# Patient Record
Sex: MALE | Race: ASIAN | NOT HISPANIC OR LATINO | URBAN - METROPOLITAN AREA
[De-identification: names, ages, dates, MRNs, and addresses within clinical notes are randomized per-mention and may not be internally consistent; named-entity substitution may affect disease eponyms.]

---

## 2020-01-01 ENCOUNTER — INPATIENT (INPATIENT)
Facility: HOSPITAL | Age: 85
LOS: 5 days | DRG: 177 | End: 2020-04-16
Attending: INTERNAL MEDICINE | Admitting: INTERNAL MEDICINE
Payer: COMMERCIAL

## 2020-01-01 VITALS
DIASTOLIC BLOOD PRESSURE: 50 MMHG | OXYGEN SATURATION: 89 % | SYSTOLIC BLOOD PRESSURE: 116 MMHG | RESPIRATION RATE: 36 BRPM | HEART RATE: 77 BPM | TEMPERATURE: 100 F

## 2020-01-01 VITALS
HEART RATE: 80 BPM | DIASTOLIC BLOOD PRESSURE: 80 MMHG | HEIGHT: 67 IN | TEMPERATURE: 100 F | WEIGHT: 154.98 LBS | RESPIRATION RATE: 26 BRPM | OXYGEN SATURATION: 83 % | SYSTOLIC BLOOD PRESSURE: 161 MMHG

## 2020-01-01 DIAGNOSIS — N17.9 ACUTE KIDNEY FAILURE, UNSPECIFIED: ICD-10-CM

## 2020-01-01 DIAGNOSIS — I48.91 UNSPECIFIED ATRIAL FIBRILLATION: ICD-10-CM

## 2020-01-01 DIAGNOSIS — J44.9 CHRONIC OBSTRUCTIVE PULMONARY DISEASE, UNSPECIFIED: ICD-10-CM

## 2020-01-01 DIAGNOSIS — J96.91 RESPIRATORY FAILURE, UNSPECIFIED WITH HYPOXIA: ICD-10-CM

## 2020-01-01 DIAGNOSIS — Z71.89 OTHER SPECIFIED COUNSELING: ICD-10-CM

## 2020-01-01 DIAGNOSIS — J12.9 VIRAL PNEUMONIA, UNSPECIFIED: ICD-10-CM

## 2020-01-01 DIAGNOSIS — R09.02 HYPOXEMIA: ICD-10-CM

## 2020-01-01 DIAGNOSIS — R41.0 DISORIENTATION, UNSPECIFIED: ICD-10-CM

## 2020-01-01 DIAGNOSIS — Z29.9 ENCOUNTER FOR PROPHYLACTIC MEASURES, UNSPECIFIED: ICD-10-CM

## 2020-01-01 LAB
ALBUMIN SERPL ELPH-MCNC: 2.9 G/DL — LOW (ref 3.5–5)
ALBUMIN SERPL ELPH-MCNC: 2.9 G/DL — LOW (ref 3.5–5)
ALBUMIN SERPL ELPH-MCNC: 3.3 G/DL — LOW (ref 3.5–5)
ALP SERPL-CCNC: 104 U/L — SIGNIFICANT CHANGE UP (ref 40–120)
ALP SERPL-CCNC: 130 U/L — HIGH (ref 40–120)
ALP SERPL-CCNC: 95 U/L — SIGNIFICANT CHANGE UP (ref 40–120)
ALT FLD-CCNC: 104 U/L DA — HIGH (ref 10–60)
ALT FLD-CCNC: 56 U/L DA — SIGNIFICANT CHANGE UP (ref 10–60)
ALT FLD-CCNC: 80 U/L DA — HIGH (ref 10–60)
ANION GAP SERPL CALC-SCNC: 4 MMOL/L — LOW (ref 5–17)
ANION GAP SERPL CALC-SCNC: 4 MMOL/L — LOW (ref 5–17)
ANION GAP SERPL CALC-SCNC: 6 MMOL/L — SIGNIFICANT CHANGE UP (ref 5–17)
ANION GAP SERPL CALC-SCNC: 7 MMOL/L — SIGNIFICANT CHANGE UP (ref 5–17)
ANION GAP SERPL CALC-SCNC: 8 MMOL/L — SIGNIFICANT CHANGE UP (ref 5–17)
APPEARANCE UR: CLEAR — SIGNIFICANT CHANGE UP
APTT BLD: 42.3 SEC — HIGH (ref 27.5–36.3)
AST SERPL-CCNC: 41 U/L — HIGH (ref 10–40)
AST SERPL-CCNC: 41 U/L — HIGH (ref 10–40)
AST SERPL-CCNC: 65 U/L — HIGH (ref 10–40)
BACTERIA # UR AUTO: ABNORMAL /HPF
BASOPHILS # BLD AUTO: 0.01 K/UL — SIGNIFICANT CHANGE UP (ref 0–0.2)
BASOPHILS # BLD AUTO: 0.01 K/UL — SIGNIFICANT CHANGE UP (ref 0–0.2)
BASOPHILS NFR BLD AUTO: 0.1 % — SIGNIFICANT CHANGE UP (ref 0–2)
BASOPHILS NFR BLD AUTO: 0.1 % — SIGNIFICANT CHANGE UP (ref 0–2)
BILIRUB SERPL-MCNC: 0.6 MG/DL — SIGNIFICANT CHANGE UP (ref 0.2–1.2)
BILIRUB SERPL-MCNC: 0.6 MG/DL — SIGNIFICANT CHANGE UP (ref 0.2–1.2)
BILIRUB SERPL-MCNC: 1 MG/DL — SIGNIFICANT CHANGE UP (ref 0.2–1.2)
BILIRUB UR-MCNC: NEGATIVE — SIGNIFICANT CHANGE UP
BUN SERPL-MCNC: 15 MG/DL — SIGNIFICANT CHANGE UP (ref 7–18)
BUN SERPL-MCNC: 16 MG/DL — SIGNIFICANT CHANGE UP (ref 7–18)
BUN SERPL-MCNC: 19 MG/DL — HIGH (ref 7–18)
BUN SERPL-MCNC: 31 MG/DL — HIGH (ref 7–18)
BUN SERPL-MCNC: 32 MG/DL — HIGH (ref 7–18)
CALCIUM SERPL-MCNC: 8.1 MG/DL — LOW (ref 8.4–10.5)
CALCIUM SERPL-MCNC: 8.2 MG/DL — LOW (ref 8.4–10.5)
CALCIUM SERPL-MCNC: 8.4 MG/DL — SIGNIFICANT CHANGE UP (ref 8.4–10.5)
CALCIUM SERPL-MCNC: 8.8 MG/DL — SIGNIFICANT CHANGE UP (ref 8.4–10.5)
CALCIUM SERPL-MCNC: 9.1 MG/DL — SIGNIFICANT CHANGE UP (ref 8.4–10.5)
CHLORIDE SERPL-SCNC: 105 MMOL/L — SIGNIFICANT CHANGE UP (ref 96–108)
CHLORIDE SERPL-SCNC: 110 MMOL/L — HIGH (ref 96–108)
CHLORIDE SERPL-SCNC: 112 MMOL/L — HIGH (ref 96–108)
CHLORIDE SERPL-SCNC: 112 MMOL/L — HIGH (ref 96–108)
CHLORIDE SERPL-SCNC: 113 MMOL/L — HIGH (ref 96–108)
CHOLEST SERPL-MCNC: 108 MG/DL — SIGNIFICANT CHANGE UP (ref 10–199)
CO2 SERPL-SCNC: 26 MMOL/L — SIGNIFICANT CHANGE UP (ref 22–31)
CO2 SERPL-SCNC: 26 MMOL/L — SIGNIFICANT CHANGE UP (ref 22–31)
CO2 SERPL-SCNC: 27 MMOL/L — SIGNIFICANT CHANGE UP (ref 22–31)
CO2 SERPL-SCNC: 29 MMOL/L — SIGNIFICANT CHANGE UP (ref 22–31)
CO2 SERPL-SCNC: 31 MMOL/L — SIGNIFICANT CHANGE UP (ref 22–31)
COLOR SPEC: YELLOW — SIGNIFICANT CHANGE UP
COMMENT - URINE: SIGNIFICANT CHANGE UP
CREAT SERPL-MCNC: 1 MG/DL — SIGNIFICANT CHANGE UP (ref 0.5–1.3)
CREAT SERPL-MCNC: 1.01 MG/DL — SIGNIFICANT CHANGE UP (ref 0.5–1.3)
CREAT SERPL-MCNC: 1.23 MG/DL — SIGNIFICANT CHANGE UP (ref 0.5–1.3)
CREAT SERPL-MCNC: 1.32 MG/DL — HIGH (ref 0.5–1.3)
CREAT SERPL-MCNC: 1.39 MG/DL — HIGH (ref 0.5–1.3)
CRP SERPL-MCNC: 2.52 MG/DL — HIGH (ref 0–0.4)
CRP SERPL-MCNC: 7.2 MG/DL — HIGH (ref 0–0.4)
D DIMER BLD IA.RAPID-MCNC: 2941 NG/ML DDU — HIGH
DIFF PNL FLD: ABNORMAL
EOSINOPHIL # BLD AUTO: 0.01 K/UL — SIGNIFICANT CHANGE UP (ref 0–0.5)
EOSINOPHIL # BLD AUTO: 0.03 K/UL — SIGNIFICANT CHANGE UP (ref 0–0.5)
EOSINOPHIL NFR BLD AUTO: 0.1 % — SIGNIFICANT CHANGE UP (ref 0–6)
EOSINOPHIL NFR BLD AUTO: 0.3 % — SIGNIFICANT CHANGE UP (ref 0–6)
EPI CELLS # UR: SIGNIFICANT CHANGE UP /HPF
FERRITIN SERPL-MCNC: 628 NG/ML — HIGH (ref 30–400)
FERRITIN SERPL-MCNC: 757 NG/ML — HIGH (ref 30–400)
GLUCOSE SERPL-MCNC: 131 MG/DL — HIGH (ref 70–99)
GLUCOSE SERPL-MCNC: 156 MG/DL — HIGH (ref 70–99)
GLUCOSE SERPL-MCNC: 156 MG/DL — HIGH (ref 70–99)
GLUCOSE SERPL-MCNC: 196 MG/DL — HIGH (ref 70–99)
GLUCOSE SERPL-MCNC: 92 MG/DL — SIGNIFICANT CHANGE UP (ref 70–99)
GLUCOSE UR QL: NEGATIVE — SIGNIFICANT CHANGE UP
HBA1C BLD-MCNC: 6.4 % — HIGH (ref 4–5.6)
HCT VFR BLD CALC: 40 % — SIGNIFICANT CHANGE UP (ref 39–50)
HCT VFR BLD CALC: 41.9 % — SIGNIFICANT CHANGE UP (ref 39–50)
HCT VFR BLD CALC: 42.1 % — SIGNIFICANT CHANGE UP (ref 39–50)
HCT VFR BLD CALC: 46.1 % — SIGNIFICANT CHANGE UP (ref 39–50)
HCT VFR BLD CALC: 47.2 % — SIGNIFICANT CHANGE UP (ref 39–50)
HDLC SERPL-MCNC: 25 MG/DL — LOW
HGB BLD-MCNC: 12.9 G/DL — LOW (ref 13–17)
HGB BLD-MCNC: 13.3 G/DL — SIGNIFICANT CHANGE UP (ref 13–17)
HGB BLD-MCNC: 13.6 G/DL — SIGNIFICANT CHANGE UP (ref 13–17)
HGB BLD-MCNC: 15.3 G/DL — SIGNIFICANT CHANGE UP (ref 13–17)
HGB BLD-MCNC: 15.4 G/DL — SIGNIFICANT CHANGE UP (ref 13–17)
IMM GRANULOCYTES NFR BLD AUTO: 0.6 % — SIGNIFICANT CHANGE UP (ref 0–1.5)
IMM GRANULOCYTES NFR BLD AUTO: 0.7 % — SIGNIFICANT CHANGE UP (ref 0–1.5)
INR BLD: 2.99 RATIO — HIGH (ref 0.88–1.16)
KETONES UR-MCNC: NEGATIVE — SIGNIFICANT CHANGE UP
LEUKOCYTE ESTERASE UR-ACNC: ABNORMAL
LIPID PNL WITH DIRECT LDL SERPL: 64 MG/DL — SIGNIFICANT CHANGE UP
LYMPHOCYTES # BLD AUTO: 0.81 K/UL — LOW (ref 1–3.3)
LYMPHOCYTES # BLD AUTO: 0.81 K/UL — LOW (ref 1–3.3)
LYMPHOCYTES # BLD AUTO: 7.6 % — LOW (ref 13–44)
LYMPHOCYTES # BLD AUTO: 8.8 % — LOW (ref 13–44)
MAGNESIUM SERPL-MCNC: 2.5 MG/DL — SIGNIFICANT CHANGE UP (ref 1.6–2.6)
MAGNESIUM SERPL-MCNC: 2.7 MG/DL — HIGH (ref 1.6–2.6)
MAGNESIUM SERPL-MCNC: 2.8 MG/DL — HIGH (ref 1.6–2.6)
MCHC RBC-ENTMCNC: 29.1 PG — SIGNIFICANT CHANGE UP (ref 27–34)
MCHC RBC-ENTMCNC: 29.8 PG — SIGNIFICANT CHANGE UP (ref 27–34)
MCHC RBC-ENTMCNC: 29.9 PG — SIGNIFICANT CHANGE UP (ref 27–34)
MCHC RBC-ENTMCNC: 30 PG — SIGNIFICANT CHANGE UP (ref 27–34)
MCHC RBC-ENTMCNC: 30.1 PG — SIGNIFICANT CHANGE UP (ref 27–34)
MCHC RBC-ENTMCNC: 31.6 GM/DL — LOW (ref 32–36)
MCHC RBC-ENTMCNC: 32.3 GM/DL — SIGNIFICANT CHANGE UP (ref 32–36)
MCHC RBC-ENTMCNC: 32.5 GM/DL — SIGNIFICANT CHANGE UP (ref 32–36)
MCHC RBC-ENTMCNC: 32.6 GM/DL — SIGNIFICANT CHANGE UP (ref 32–36)
MCHC RBC-ENTMCNC: 33.2 GM/DL — SIGNIFICANT CHANGE UP (ref 32–36)
MCV RBC AUTO: 90.2 FL — SIGNIFICANT CHANGE UP (ref 80–100)
MCV RBC AUTO: 91.8 FL — SIGNIFICANT CHANGE UP (ref 80–100)
MCV RBC AUTO: 92.1 FL — SIGNIFICANT CHANGE UP (ref 80–100)
MCV RBC AUTO: 92.4 FL — SIGNIFICANT CHANGE UP (ref 80–100)
MCV RBC AUTO: 92.7 FL — SIGNIFICANT CHANGE UP (ref 80–100)
MONOCYTES # BLD AUTO: 0.5 K/UL — SIGNIFICANT CHANGE UP (ref 0–0.9)
MONOCYTES # BLD AUTO: 0.72 K/UL — SIGNIFICANT CHANGE UP (ref 0–0.9)
MONOCYTES NFR BLD AUTO: 5.4 % — SIGNIFICANT CHANGE UP (ref 2–14)
MONOCYTES NFR BLD AUTO: 6.8 % — SIGNIFICANT CHANGE UP (ref 2–14)
NEUTROPHILS # BLD AUTO: 7.77 K/UL — HIGH (ref 1.8–7.4)
NEUTROPHILS # BLD AUTO: 8.99 K/UL — HIGH (ref 1.8–7.4)
NEUTROPHILS NFR BLD AUTO: 84.7 % — HIGH (ref 43–77)
NEUTROPHILS NFR BLD AUTO: 84.8 % — HIGH (ref 43–77)
NITRITE UR-MCNC: NEGATIVE — SIGNIFICANT CHANGE UP
NRBC # BLD: 0 /100 WBCS — SIGNIFICANT CHANGE UP (ref 0–0)
PH UR: 6 — SIGNIFICANT CHANGE UP (ref 5–8)
PHOSPHATE SERPL-MCNC: 2.1 MG/DL — LOW (ref 2.5–4.5)
PHOSPHATE SERPL-MCNC: 2.4 MG/DL — LOW (ref 2.5–4.5)
PHOSPHATE SERPL-MCNC: 3 MG/DL — SIGNIFICANT CHANGE UP (ref 2.5–4.5)
PLATELET # BLD AUTO: 153 K/UL — SIGNIFICANT CHANGE UP (ref 150–400)
PLATELET # BLD AUTO: 217 K/UL — SIGNIFICANT CHANGE UP (ref 150–400)
PLATELET # BLD AUTO: 220 K/UL — SIGNIFICANT CHANGE UP (ref 150–400)
PLATELET # BLD AUTO: 230 K/UL — SIGNIFICANT CHANGE UP (ref 150–400)
PLATELET # BLD AUTO: 235 K/UL — SIGNIFICANT CHANGE UP (ref 150–400)
POTASSIUM SERPL-MCNC: 3.2 MMOL/L — LOW (ref 3.5–5.3)
POTASSIUM SERPL-MCNC: 3.4 MMOL/L — LOW (ref 3.5–5.3)
POTASSIUM SERPL-MCNC: 4 MMOL/L — SIGNIFICANT CHANGE UP (ref 3.5–5.3)
POTASSIUM SERPL-MCNC: 4.1 MMOL/L — SIGNIFICANT CHANGE UP (ref 3.5–5.3)
POTASSIUM SERPL-MCNC: 4.4 MMOL/L — SIGNIFICANT CHANGE UP (ref 3.5–5.3)
POTASSIUM SERPL-SCNC: 3.2 MMOL/L — LOW (ref 3.5–5.3)
POTASSIUM SERPL-SCNC: 3.4 MMOL/L — LOW (ref 3.5–5.3)
POTASSIUM SERPL-SCNC: 4 MMOL/L — SIGNIFICANT CHANGE UP (ref 3.5–5.3)
POTASSIUM SERPL-SCNC: 4.1 MMOL/L — SIGNIFICANT CHANGE UP (ref 3.5–5.3)
POTASSIUM SERPL-SCNC: 4.4 MMOL/L — SIGNIFICANT CHANGE UP (ref 3.5–5.3)
PROCALCITONIN SERPL-MCNC: 0.11 NG/ML — HIGH (ref 0.02–0.1)
PROT SERPL-MCNC: 7.4 G/DL — SIGNIFICANT CHANGE UP (ref 6–8.3)
PROT SERPL-MCNC: 7.9 G/DL — SIGNIFICANT CHANGE UP (ref 6–8.3)
PROT SERPL-MCNC: 8.9 G/DL — HIGH (ref 6–8.3)
PROT UR-MCNC: 30 MG/DL
PROTHROM AB SERPL-ACNC: 35 SEC — HIGH (ref 10–12.9)
RBC # BLD: 4.33 M/UL — SIGNIFICANT CHANGE UP (ref 4.2–5.8)
RBC # BLD: 4.52 M/UL — SIGNIFICANT CHANGE UP (ref 4.2–5.8)
RBC # BLD: 4.57 M/UL — SIGNIFICANT CHANGE UP (ref 4.2–5.8)
RBC # BLD: 5.11 M/UL — SIGNIFICANT CHANGE UP (ref 4.2–5.8)
RBC # BLD: 5.14 M/UL — SIGNIFICANT CHANGE UP (ref 4.2–5.8)
RBC # FLD: 12.3 % — SIGNIFICANT CHANGE UP (ref 10.3–14.5)
RBC # FLD: 12.4 % — SIGNIFICANT CHANGE UP (ref 10.3–14.5)
RBC # FLD: 12.4 % — SIGNIFICANT CHANGE UP (ref 10.3–14.5)
RBC # FLD: 12.6 % — SIGNIFICANT CHANGE UP (ref 10.3–14.5)
RBC # FLD: 12.8 % — SIGNIFICANT CHANGE UP (ref 10.3–14.5)
RBC CASTS # UR COMP ASSIST: ABNORMAL /HPF (ref 0–2)
SARS-COV-2 RNA SPEC QL NAA+PROBE: DETECTED
SODIUM SERPL-SCNC: 140 MMOL/L — SIGNIFICANT CHANGE UP (ref 135–145)
SODIUM SERPL-SCNC: 144 MMOL/L — SIGNIFICANT CHANGE UP (ref 135–145)
SODIUM SERPL-SCNC: 144 MMOL/L — SIGNIFICANT CHANGE UP (ref 135–145)
SODIUM SERPL-SCNC: 145 MMOL/L — SIGNIFICANT CHANGE UP (ref 135–145)
SODIUM SERPL-SCNC: 147 MMOL/L — HIGH (ref 135–145)
SP GR SPEC: 1.01 — SIGNIFICANT CHANGE UP (ref 1.01–1.02)
TOTAL CHOLESTEROL/HDL RATIO MEASUREMENT: 4.3 RATIO — SIGNIFICANT CHANGE UP (ref 3.4–9.6)
TRIGL SERPL-MCNC: 95 MG/DL — SIGNIFICANT CHANGE UP (ref 10–149)
TSH SERPL-MCNC: 0.11 UU/ML — LOW (ref 0.34–4.82)
UROBILINOGEN FLD QL: NEGATIVE — SIGNIFICANT CHANGE UP
VIT B12 SERPL-MCNC: 1505 PG/ML — HIGH (ref 232–1245)
WBC # BLD: 10.6 K/UL — HIGH (ref 3.8–10.5)
WBC # BLD: 12.57 K/UL — HIGH (ref 3.8–10.5)
WBC # BLD: 14.32 K/UL — HIGH (ref 3.8–10.5)
WBC # BLD: 6.92 K/UL — SIGNIFICANT CHANGE UP (ref 3.8–10.5)
WBC # BLD: 9.18 K/UL — SIGNIFICANT CHANGE UP (ref 3.8–10.5)
WBC # FLD AUTO: 10.6 K/UL — HIGH (ref 3.8–10.5)
WBC # FLD AUTO: 12.57 K/UL — HIGH (ref 3.8–10.5)
WBC # FLD AUTO: 14.32 K/UL — HIGH (ref 3.8–10.5)
WBC # FLD AUTO: 6.92 K/UL — SIGNIFICANT CHANGE UP (ref 3.8–10.5)
WBC # FLD AUTO: 9.18 K/UL — SIGNIFICANT CHANGE UP (ref 3.8–10.5)
WBC UR QL: SIGNIFICANT CHANGE UP /HPF (ref 0–5)

## 2020-01-01 PROCEDURE — 71045 X-RAY EXAM CHEST 1 VIEW: CPT | Mod: 26

## 2020-01-01 PROCEDURE — 93005 ELECTROCARDIOGRAM TRACING: CPT

## 2020-01-01 PROCEDURE — 83036 HEMOGLOBIN GLYCOSYLATED A1C: CPT

## 2020-01-01 PROCEDURE — 82728 ASSAY OF FERRITIN: CPT

## 2020-01-01 PROCEDURE — 83735 ASSAY OF MAGNESIUM: CPT

## 2020-01-01 PROCEDURE — 85730 THROMBOPLASTIN TIME PARTIAL: CPT

## 2020-01-01 PROCEDURE — 87635 SARS-COV-2 COVID-19 AMP PRB: CPT

## 2020-01-01 PROCEDURE — 84100 ASSAY OF PHOSPHORUS: CPT

## 2020-01-01 PROCEDURE — 99285 EMERGENCY DEPT VISIT HI MDM: CPT

## 2020-01-01 PROCEDURE — 85027 COMPLETE CBC AUTOMATED: CPT

## 2020-01-01 PROCEDURE — 80053 COMPREHEN METABOLIC PANEL: CPT

## 2020-01-01 PROCEDURE — 80061 LIPID PANEL: CPT

## 2020-01-01 PROCEDURE — 86140 C-REACTIVE PROTEIN: CPT

## 2020-01-01 PROCEDURE — 85610 PROTHROMBIN TIME: CPT

## 2020-01-01 PROCEDURE — 82607 VITAMIN B-12: CPT

## 2020-01-01 PROCEDURE — 84145 PROCALCITONIN (PCT): CPT

## 2020-01-01 PROCEDURE — 85379 FIBRIN DEGRADATION QUANT: CPT

## 2020-01-01 PROCEDURE — 80048 BASIC METABOLIC PNL TOTAL CA: CPT

## 2020-01-01 PROCEDURE — 71045 X-RAY EXAM CHEST 1 VIEW: CPT

## 2020-01-01 PROCEDURE — 99285 EMERGENCY DEPT VISIT HI MDM: CPT | Mod: 25

## 2020-01-01 PROCEDURE — 84443 ASSAY THYROID STIM HORMONE: CPT

## 2020-01-01 PROCEDURE — 81001 URINALYSIS AUTO W/SCOPE: CPT

## 2020-01-01 PROCEDURE — 36415 COLL VENOUS BLD VENIPUNCTURE: CPT

## 2020-01-01 RX ORDER — RIVAROXABAN 15 MG-20MG
15 KIT ORAL
Refills: 0 | Status: DISCONTINUED | OUTPATIENT
Start: 2020-01-01 | End: 2020-01-01

## 2020-01-01 RX ORDER — SODIUM CHLORIDE 9 MG/ML
1000 INJECTION INTRAMUSCULAR; INTRAVENOUS; SUBCUTANEOUS
Refills: 0 | Status: DISCONTINUED | OUTPATIENT
Start: 2020-01-01 | End: 2020-01-01

## 2020-01-01 RX ORDER — ROBINUL 0.2 MG/ML
1 INJECTION INTRAMUSCULAR; INTRAVENOUS
Refills: 0 | Status: DISCONTINUED | OUTPATIENT
Start: 2020-01-01 | End: 2020-01-01

## 2020-01-01 RX ORDER — HYDROMORPHONE HYDROCHLORIDE 2 MG/ML
0.5 INJECTION INTRAMUSCULAR; INTRAVENOUS; SUBCUTANEOUS EVERY 4 HOURS
Refills: 0 | Status: DISCONTINUED | OUTPATIENT
Start: 2020-01-01 | End: 2020-01-01

## 2020-01-01 RX ORDER — SODIUM CHLORIDE 9 MG/ML
1000 INJECTION INTRAMUSCULAR; INTRAVENOUS; SUBCUTANEOUS ONCE
Refills: 0 | Status: COMPLETED | OUTPATIENT
Start: 2020-01-01 | End: 2020-01-01

## 2020-01-01 RX ORDER — ANAKINRA 100MG/0.67
100 SYRINGE (ML) SUBCUTANEOUS EVERY 6 HOURS
Refills: 0 | Status: DISCONTINUED | OUTPATIENT
Start: 2020-01-01 | End: 2020-01-01

## 2020-01-01 RX ORDER — ANAKINRA 100MG/0.67
SYRINGE (ML) SUBCUTANEOUS
Refills: 0 | Status: DISCONTINUED | OUTPATIENT
Start: 2020-01-01 | End: 2020-01-01

## 2020-01-01 RX ORDER — ACETAMINOPHEN 500 MG
650 TABLET ORAL EVERY 6 HOURS
Refills: 0 | Status: DISCONTINUED | OUTPATIENT
Start: 2020-01-01 | End: 2020-01-01

## 2020-01-01 RX ORDER — LATANOPROST 0.05 MG/ML
1 SOLUTION/ DROPS OPHTHALMIC; TOPICAL
Qty: 0 | Refills: 0 | DISCHARGE

## 2020-01-01 RX ORDER — HYDROMORPHONE HYDROCHLORIDE 2 MG/ML
0.5 INJECTION INTRAMUSCULAR; INTRAVENOUS; SUBCUTANEOUS ONCE
Refills: 0 | Status: DISCONTINUED | OUTPATIENT
Start: 2020-01-01 | End: 2020-01-01

## 2020-01-01 RX ORDER — METOPROLOL TARTRATE 50 MG
1 TABLET ORAL
Qty: 0 | Refills: 0 | DISCHARGE

## 2020-01-01 RX ORDER — HYDROXYCHLOROQUINE SULFATE 200 MG
800 TABLET ORAL EVERY 24 HOURS
Refills: 0 | Status: COMPLETED | OUTPATIENT
Start: 2020-01-01 | End: 2020-01-01

## 2020-01-01 RX ORDER — HALOPERIDOL DECANOATE 100 MG/ML
1 INJECTION INTRAMUSCULAR ONCE
Refills: 0 | Status: COMPLETED | OUTPATIENT
Start: 2020-01-01 | End: 2020-01-01

## 2020-01-01 RX ORDER — METOPROLOL TARTRATE 50 MG
25 TABLET ORAL DAILY
Refills: 0 | Status: DISCONTINUED | OUTPATIENT
Start: 2020-01-01 | End: 2020-01-01

## 2020-01-01 RX ORDER — POTASSIUM CHLORIDE 20 MEQ
20 PACKET (EA) ORAL ONCE
Refills: 0 | Status: COMPLETED | OUTPATIENT
Start: 2020-01-01 | End: 2020-01-01

## 2020-01-01 RX ORDER — HYDROXYCHLOROQUINE SULFATE 200 MG
400 TABLET ORAL EVERY 24 HOURS
Refills: 0 | Status: COMPLETED | OUTPATIENT
Start: 2020-01-01 | End: 2020-01-01

## 2020-01-01 RX ORDER — HYDROXYCHLOROQUINE SULFATE 200 MG
TABLET ORAL
Refills: 0 | Status: COMPLETED | OUTPATIENT
Start: 2020-01-01 | End: 2020-01-01

## 2020-01-01 RX ORDER — MORPHINE SULFATE 50 MG/1
1 CAPSULE, EXTENDED RELEASE ORAL ONCE
Refills: 0 | Status: DISCONTINUED | OUTPATIENT
Start: 2020-01-01 | End: 2020-01-01

## 2020-01-01 RX ORDER — RIVAROXABAN 15 MG-20MG
1 KIT ORAL
Qty: 0 | Refills: 0 | DISCHARGE

## 2020-01-01 RX ORDER — LATANOPROST 0.05 MG/ML
1 SOLUTION/ DROPS OPHTHALMIC; TOPICAL AT BEDTIME
Refills: 0 | Status: DISCONTINUED | OUTPATIENT
Start: 2020-01-01 | End: 2020-01-01

## 2020-01-01 RX ORDER — ACETAMINOPHEN 500 MG
975 TABLET ORAL ONCE
Refills: 0 | Status: COMPLETED | OUTPATIENT
Start: 2020-01-01 | End: 2020-01-01

## 2020-01-01 RX ORDER — FLUTICASONE FUROATE AND VILANTEROL TRIFENATATE 100; 25 UG/1; UG/1
1 POWDER RESPIRATORY (INHALATION)
Qty: 0 | Refills: 0 | DISCHARGE

## 2020-01-01 RX ORDER — ROBINUL 0.2 MG/ML
0.2 INJECTION INTRAMUSCULAR; INTRAVENOUS EVERY 6 HOURS
Refills: 0 | Status: DISCONTINUED | OUTPATIENT
Start: 2020-01-01 | End: 2020-01-01

## 2020-01-01 RX ORDER — POTASSIUM PHOSPHATE, MONOBASIC POTASSIUM PHOSPHATE, DIBASIC 236; 224 MG/ML; MG/ML
15 INJECTION, SOLUTION INTRAVENOUS ONCE
Refills: 0 | Status: COMPLETED | OUTPATIENT
Start: 2020-01-01 | End: 2020-01-01

## 2020-01-01 RX ORDER — ENOXAPARIN SODIUM 100 MG/ML
40 INJECTION SUBCUTANEOUS DAILY
Refills: 0 | Status: DISCONTINUED | OUTPATIENT
Start: 2020-01-01 | End: 2020-01-01

## 2020-01-01 RX ORDER — POTASSIUM CHLORIDE 20 MEQ
40 PACKET (EA) ORAL ONCE
Refills: 0 | Status: COMPLETED | OUTPATIENT
Start: 2020-01-01 | End: 2020-01-01

## 2020-01-01 RX ADMIN — HYDROMORPHONE HYDROCHLORIDE 0.5 MILLIGRAM(S): 2 INJECTION INTRAMUSCULAR; INTRAVENOUS; SUBCUTANEOUS at 01:48

## 2020-01-01 RX ADMIN — Medication 40 MILLIGRAM(S): at 17:23

## 2020-01-01 RX ADMIN — Medication 40 MILLIGRAM(S): at 17:28

## 2020-01-01 RX ADMIN — Medication 100 MILLIGRAM(S): at 04:33

## 2020-01-01 RX ADMIN — HYDROMORPHONE HYDROCHLORIDE 0.5 MILLIGRAM(S): 2 INJECTION INTRAMUSCULAR; INTRAVENOUS; SUBCUTANEOUS at 14:59

## 2020-01-01 RX ADMIN — HALOPERIDOL DECANOATE 1 MILLIGRAM(S): 100 INJECTION INTRAMUSCULAR at 11:23

## 2020-01-01 RX ADMIN — HYDROMORPHONE HYDROCHLORIDE 0.5 MILLIGRAM(S): 2 INJECTION INTRAMUSCULAR; INTRAVENOUS; SUBCUTANEOUS at 21:42

## 2020-01-01 RX ADMIN — Medication 40 MILLIGRAM(S): at 04:34

## 2020-01-01 RX ADMIN — Medication 100 MILLIGRAM(S): at 18:03

## 2020-01-01 RX ADMIN — HYDROMORPHONE HYDROCHLORIDE 0.5 MILLIGRAM(S): 2 INJECTION INTRAMUSCULAR; INTRAVENOUS; SUBCUTANEOUS at 05:13

## 2020-01-01 RX ADMIN — Medication 100 MILLIGRAM(S): at 21:44

## 2020-01-01 RX ADMIN — Medication 40 MILLIGRAM(S): at 05:40

## 2020-01-01 RX ADMIN — Medication 100 MILLIGRAM(S): at 22:10

## 2020-01-01 RX ADMIN — Medication 20 MILLIEQUIVALENT(S): at 17:27

## 2020-01-01 RX ADMIN — RIVAROXABAN 15 MILLIGRAM(S): KIT at 17:23

## 2020-01-01 RX ADMIN — SODIUM CHLORIDE 1000 MILLILITER(S): 9 INJECTION INTRAMUSCULAR; INTRAVENOUS; SUBCUTANEOUS at 13:42

## 2020-01-01 RX ADMIN — RIVAROXABAN 15 MILLIGRAM(S): KIT at 19:33

## 2020-01-01 RX ADMIN — Medication 400 MILLIGRAM(S): at 21:14

## 2020-01-01 RX ADMIN — LATANOPROST 1 DROP(S): 0.05 SOLUTION/ DROPS OPHTHALMIC; TOPICAL at 20:59

## 2020-01-01 RX ADMIN — Medication 40 MILLIGRAM(S): at 06:00

## 2020-01-01 RX ADMIN — Medication 40 MILLIGRAM(S): at 22:17

## 2020-01-01 RX ADMIN — SODIUM CHLORIDE 1000 MILLILITER(S): 9 INJECTION INTRAMUSCULAR; INTRAVENOUS; SUBCUTANEOUS at 14:00

## 2020-01-01 RX ADMIN — Medication 400 MILLIGRAM(S): at 20:58

## 2020-01-01 RX ADMIN — Medication 100 MILLIGRAM(S): at 05:41

## 2020-01-01 RX ADMIN — HYDROMORPHONE HYDROCHLORIDE 0.5 MILLIGRAM(S): 2 INJECTION INTRAMUSCULAR; INTRAVENOUS; SUBCUTANEOUS at 14:16

## 2020-01-01 RX ADMIN — ROBINUL 0.2 MILLIGRAM(S): 0.2 INJECTION INTRAMUSCULAR; INTRAVENOUS at 13:18

## 2020-01-01 RX ADMIN — SODIUM CHLORIDE 65 MILLILITER(S): 9 INJECTION INTRAMUSCULAR; INTRAVENOUS; SUBCUTANEOUS at 21:42

## 2020-01-01 RX ADMIN — Medication 40 MILLIGRAM(S): at 18:03

## 2020-01-01 RX ADMIN — Medication 400 MILLIGRAM(S): at 22:18

## 2020-01-01 RX ADMIN — Medication 40 MILLIEQUIVALENT(S): at 22:17

## 2020-01-01 RX ADMIN — SODIUM CHLORIDE 65 MILLILITER(S): 9 INJECTION INTRAMUSCULAR; INTRAVENOUS; SUBCUTANEOUS at 22:29

## 2020-01-01 RX ADMIN — RIVAROXABAN 15 MILLIGRAM(S): KIT at 17:16

## 2020-01-01 RX ADMIN — RIVAROXABAN 15 MILLIGRAM(S): KIT at 17:28

## 2020-01-01 RX ADMIN — Medication 100 MILLIGRAM(S): at 11:25

## 2020-01-01 RX ADMIN — Medication 40 MILLIGRAM(S): at 17:16

## 2020-01-01 RX ADMIN — Medication 800 MILLIGRAM(S): at 22:29

## 2020-01-01 RX ADMIN — HYDROMORPHONE HYDROCHLORIDE 0.5 MILLIGRAM(S): 2 INJECTION INTRAMUSCULAR; INTRAVENOUS; SUBCUTANEOUS at 15:36

## 2020-01-01 RX ADMIN — Medication 100 MILLIGRAM(S): at 11:40

## 2020-01-01 RX ADMIN — POTASSIUM PHOSPHATE, MONOBASIC POTASSIUM PHOSPHATE, DIBASIC 62.5 MILLIMOLE(S): 236; 224 INJECTION, SOLUTION INTRAVENOUS at 21:14

## 2020-01-01 RX ADMIN — Medication 100 MILLIGRAM(S): at 17:16

## 2020-01-01 RX ADMIN — LATANOPROST 1 DROP(S): 0.05 SOLUTION/ DROPS OPHTHALMIC; TOPICAL at 21:14

## 2020-01-01 RX ADMIN — MORPHINE SULFATE 1 MILLIGRAM(S): 50 CAPSULE, EXTENDED RELEASE ORAL at 12:49

## 2020-01-01 RX ADMIN — LATANOPROST 1 DROP(S): 0.05 SOLUTION/ DROPS OPHTHALMIC; TOPICAL at 22:18

## 2020-01-01 RX ADMIN — Medication 100 MILLIGRAM(S): at 14:59

## 2020-01-01 RX ADMIN — LATANOPROST 1 DROP(S): 0.05 SOLUTION/ DROPS OPHTHALMIC; TOPICAL at 22:30

## 2020-01-01 RX ADMIN — LATANOPROST 1 DROP(S): 0.05 SOLUTION/ DROPS OPHTHALMIC; TOPICAL at 21:43

## 2020-01-01 RX ADMIN — Medication 975 MILLIGRAM(S): at 13:42

## 2020-01-01 RX ADMIN — RIVAROXABAN 15 MILLIGRAM(S): KIT at 17:35

## 2020-01-01 RX ADMIN — Medication 40 MILLIGRAM(S): at 04:26

## 2020-01-01 RX ADMIN — LATANOPROST 1 DROP(S): 0.05 SOLUTION/ DROPS OPHTHALMIC; TOPICAL at 23:09

## 2020-01-01 RX ADMIN — SODIUM CHLORIDE 65 MILLILITER(S): 9 INJECTION INTRAMUSCULAR; INTRAVENOUS; SUBCUTANEOUS at 20:11

## 2020-04-10 NOTE — H&P ADULT - ASSESSMENT
87M from home, [place] speaking,  ambulating with walker with PMH of COPD (EMPHYSEMA), Atrial fib, Borderline HTN, comes to the ED with complaints of shortness of breath. Pt was apparently normal till a week ago when he first developed fever and dry cough. He visited his Primary care and was presribed  Azithromycin and Hcq. He took the Azithromycin for 3 days. His symptoms had not resolved. His oxygen saturations at the home pulse oximetry was noted to be running low between 75-80% that concerned him to visit  the hospital. His 87y wife is also endorsing similar symptoms with lesser intensity. She is also been admitted for the Covid r/o.   Pt admitted for R/O COVID  DNR/DNI

## 2020-04-10 NOTE — H&P ADULT - PROBLEM SELECTOR PLAN 7
IMPROVE VTE Individual Risk Assessment    RISK                                                          Points  [] Previous VTE                                           3  [] Thrombophilia                                        2  [] Lower limb paralysis                              2   [] Current Cancer                                       2   [] Immobilization > 24 hrs                        1  []x ICU/CCU stay > 24 hours                       1  [x] Age > 60                                                   1    IMPROVE VTE Score: 2  Pt on xarelto 20mg od

## 2020-04-10 NOTE — ED ADULT NURSE NOTE - NSIMPLEMENTINTERV_GEN_ALL_ED
Implemented All Fall Risk Interventions:  [place] to call system. Call bell, personal items and telephone within reach. Instruct patient to call for assistance. Room bathroom lighting operational. Non-slip footwear when patient is off stretcher. Physically safe environment: no spills, clutter or unnecessary equipment. Stretcher in lowest position, wheels locked, appropriate side rails in place. Provide visual cue, wrist band, yellow gown, etc. Monitor gait and stability. Monitor for mental status changes and reorient to person, place, and time. Review medications for side effects contributing to fall risk. Reinforce activity limits and safety measures with patient and family.

## 2020-04-10 NOTE — H&P ADULT - HISTORY OF PRESENT ILLNESS
89M from home with         In the ED,   WBC elevated 10.6k  Creat 1.3. s/p IVF 87M from home, ambulating with walker with PMH of COPD(EMPHYSEMA) Borderline HTN, comes to the ED with complaints of shortness of breath. Pt was apparently normal till a week ago when he first developed fever and dry cough. He visited his Primary care and was presribed          In the ED,   WBC elevated 10.6k  Creat 1.3. s/p IVF 87M from home, [place] speaking,  ambulating with walker with PMH of COPD (EMPHYSEMA), Atrial fib, Borderline HTN, comes to the ED with complaints of shortness of breath. Pt was apparently normal till a week ago when he first developed fever and dry cough. He visited his Primary care and was presribed  Azithromycin and Hcq. He took the Azithromycin for 3 days. His symptoms had not resolved. His oxygen saturations at the home pulse oximetry was noted to be running low between 75-80% that concerned him to visit  the hospital. His 87y wife is also endorsing similar symptoms with lesser intensity. She is also been admitted for the Covid r/o.   History obtained over phone from the daughter Miguel Mccoy- [phone].     In the ED,   Pt on the  NRB, sitting comfortably  WBC elevated 10.6k  Creat 1.3. s/p IVF  VItals- afebrile, /67  RR- 24, SPO2- 97% on NRB    DNR/DNI

## 2020-04-10 NOTE — H&P ADULT - NSHPPHYSICALEXAM_GEN_ALL_CORE
Vital Signs (24 Hrs):  T(C): 37.2 (04-10-20 @ 15:49), Max: 37.6 (04-10-20 @ 12:05)  HR: 76 (04-10-20 @ 15:49) (76 - 80)  BP: 129/67 (04-10-20 @ 15:49) (129/67 - 161/80)  RR: 28 (04-10-20 @ 15:49) (26 - 28)  SpO2: 100% (04-10-20 @ 15:49) (83% - 100%)  Wt(kg): --  Daily Height in cm: 170.18 (10 Apr 2020 12:05)    Daily     I&O's Summary

## 2020-04-10 NOTE — H&P ADULT - NSHPSOCIALHISTORY_GEN_ALL_CORE
Pt stays at home with wife and daughter. Pt ambulates with walker  Wife also admitted for covid r/o  pt had quit smoking 40 yrs ago, no history pf any alcohol use.

## 2020-04-10 NOTE — H&P ADULT - PROBLEM SELECTOR PLAN 2
Pt has PMH of atrial fibrillation  on telemetry monitoring  c/w home medication xarelto and metoprolol

## 2020-04-10 NOTE — H&P ADULT - PROBLEM SELECTOR PLAN 4
pt has known PMH of COPD  no acute exacerbration  c/w albuterol inhalation prn same as above  pt completed OP course of Azithrox3 days

## 2020-04-10 NOTE — ED PROVIDER NOTE - EYES NEGATIVE STATEMENT, MLM
Keep the cast/splint/dressing clean and dry./Verbal/written post procedure instructions were given to patient/caregiver./Elevate the injured extremity as instructed./Instructed patient/caregiver to follow-up with primary care physician. no discharge, no irritation, no pain, no redness, and no visual changes.

## 2020-04-10 NOTE — ED PROVIDER NOTE - TOBACCO USE
Pt states diarrhea and vomiting since Monday, fevers/chills, sore throat, cough, nasal congestion, chest heaviness  Pt is alert and oriented, resps slightly increased, skin pink hot and dry, dry cough noted  
Unknown if ever smoked

## 2020-04-10 NOTE — H&P ADULT - PROBLEM SELECTOR PLAN 3
same as above  pt completed OP course of Azithrox3 days On admission creatinine levels elevated  1.3( baseline unknown)  s/p NS bolus in the ED   C/W IV hydration  f/u BMP am

## 2020-04-10 NOTE — H&P ADULT - PROBLEM SELECTOR PLAN 6
IMPROVE VTE Individual Risk Assessment    RISK                                                          Points  [] Previous VTE                                           3  [] Thrombophilia                                        2  [] Lower limb paralysis                              2   [] Current Cancer                                       2   [] Immobilization > 24 hrs                        1  []x ICU/CCU stay > 24 hours                       1  [x] Age > 60                                                   1    IMPROVE VTE Score: 2  Pt on xarelto 20mg od GOC discussion done with daughter Miguel Mccoy- [phone]  Pt poor prognosis explained.  Daughter accepted for the DNR/DNI

## 2020-04-10 NOTE — H&P ADULT - PROBLEM SELECTOR PLAN 1
p/w SOB, cough   - WBC:mild elevation 10k   - CXR -- b/l pneumonia likely 2/2 viral pna   - Pt on NRB, SPO2 975, no signs of distress noted,  Also Will rule out covid 19; testing : contact and airborne isolation precaution   - LDH, Procalcitonin, ferritin   - Tylenol, Albuterol Inhaler, Robitussin PRN p/w SOB, cough   - WBC:mild elevation 10k   - CXR -- b/l pneumonia likely 2/2 viral pna   - Pt on NRB, SPO2 97%, no signs of distress noted,  -Started on plaquenil.   -on contact and airborne isolation precaution   f/u Covid-19 results  - LDH, Procalcitonin, ferritin   - Tylenol, Albuterol Inhaler, Robitussin PRN

## 2020-04-10 NOTE — H&P ADULT - PROBLEM SELECTOR PLAN 5
GOC discussion done with daughter Miguel Mccoy- [phone]  Pt poor prognosis explained.  Daughter accepted for the DNR/DNI pt has known PMH of COPD  no acute exacerbration  c/w albuterol inhalation prn

## 2020-04-11 NOTE — PROGRESS NOTE ADULT - ASSESSMENT
87M from home, [place] speaking,  ambulating with walker with PMH of COPD (EMPHYSEMA), Atrial fib, Borderline HTN, comes to the ED with complaints of shortness of breath. Pt was apparently normal till a week ago when he first developed fever and dry cough. He visited his Primary care and was presribed  Azithromycin and Hcq. He took the Azithromycin for 3 days. His symptoms had not resolved. His oxygen saturations at the home pulse oximetry was noted to be running low between 75-80% that concerned him to visit  the hospital. His 87y wife is also endorsing similar symptoms with lesser intensity. She is also been admitted for the Covid r/o.   Pt admitted for R/O COVID  DNR/DNI     Problem/Plan - 1:  ·  Problem: Acute Respiratory failure with hypoxia.  Plan: Sec to COVID 19 . Solumedrol added. p/w SOB, cough   - WBC:mild elevation 10k   - CXR -- b/l pneumonia likely 2/2 viral pna   - Pt on NRB, SPO2 97%, no signs of distress noted,  -Started on plaquenil.   -on contact and airborne isolation precaution   f/u Covid-19 results  - LDH, Procalcitonin, ferritin   - Tylenol, Albuterol Inhaler, Robitussin PRN.      Problem/Plan - 2:  ·  Problem: Atrial fibrillation.  Plan: Pt has PMH of atrial fibrillation  on telemetry monitoring  c/w home medication xarelto and metoprolol.      Problem/Plan - 3:  ·  Problem: SUNG (acute kidney injury).  Plan: On admission creatinine levels elevated  1.3( baseline unknown)  s/p NS bolus in the ED   C/W IV hydration  f/u BMP am.      Problem/Plan - 4:  ·  Problem: Viral pneumonia.  Plan: same as above  pt completed OP course of Azithrox3 days.      Problem/Plan - 5:  ·  Problem: COPD (chronic obstructive pulmonary disease).  Plan: pt has known PMH of COPD  no acute exacerbration  c/w albuterol inhalation prn.      Problem/Plan - 6:  Problem: Goals of care, counseling/discussion. Plan: GOC discussion done with daughter Miguel Mccoy- [phone]  Pt poor prognosis explained.  Daughter accepted for the DNR/DNI.     Problem/Plan - 7:  ·  Problem: Prophylactic measure.  Plan: IMPROVE VTE Individual Risk Assessmen                                               1    IMPROVE VTE Score: 2  Pt on xarelto 20mg od.

## 2020-04-11 NOTE — CHART NOTE - NSCHARTNOTEFT_GEN_A_CORE
-notified by RN, pt's HR in high 30's on noninvasive BP machine, pt asymptomatic   -12 lead EKG completed and showed rate controlled Afib with RBBB  -monitor radial or apical pulse manually

## 2020-04-12 NOTE — PROGRESS NOTE ADULT - SUBJECTIVE AND OBJECTIVE BOX
INTERVAL HPI/OVERNIGHT EVENTS: Seen and examined.   Vital Signs Last 24 Hrs  T(C): 36.7 (2020 19:49), Max: 36.9 (2020 21:16)  T(F): 98.1 (2020 19:49), Max: 98.4 (2020 21:16)  HR: 100 (2020 19:49) (97 - 110)  BP: 106/93 (2020 19:49) (106/93 - 149/102)  BP(mean): --  RR: 22 (2020 19:49) (20 - 28)  SpO2: 94% (2020 19:49) (94% - 100%)  I&O's Summary    2020 07:01  -  2020 07:00  --------------------------------------------------------  IN: 0 mL / OUT: 100 mL / NET: -100 mL    2020 07:01  -  2020 20:03  --------------------------------------------------------  IN: 0 mL / OUT: 100 mL / NET: -100 mL      MEDICATIONS  (STANDING):  hydroxychloroquine 400 milliGRAM(s) Oral every 24 hours  hydroxychloroquine   Oral   latanoprost 0.005% Ophthalmic Solution 1 Drop(s) Both EYES at bedtime  methylPREDNISolone sodium succinate Injectable 40 milliGRAM(s) IV Push two times a day  rivaroxaban 15 milliGRAM(s) Oral with dinner  sodium chloride 0.9%. 1000 milliLiter(s) (65 mL/Hr) IV Continuous <Continuous>    MEDICATIONS  (PRN):    LABS:                        12.9   6.92  )-----------( 217      ( 2020 06:56 )             40.0     04-12    144  |  113<H>  |  16  ----------------------------<  156<H>  4.4   |  27  |  1.01    Ca    8.1<L>      2020 06:56  Phos  2.4     04-12  Mg     2.7     04-12    TPro  7.9  /  Alb  2.9<L>  /  TBili  0.6  /  DBili  x   /  AST  41<H>  /  ALT  80<H>  /  AlkPhos  95  04-11      Urinalysis Basic - ( 10 Apr 2020 20:28 )    Color: Yellow / Appearance: Clear / S.010 / pH: x  Gluc: x / Ketone: Negative  / Bili: Negative / Urobili: Negative   Blood: x / Protein: 30 mg/dL / Nitrite: Negative   Leuk Esterase: Small / RBC: 2-5 /HPF / WBC 3-5 /HPF   Sq Epi: x / Non Sq Epi: Few /HPF / Bacteria: Few /HPF      CAPILLARY BLOOD GLUCOSE            Urinalysis Basic - ( 10 Apr 2020 20:28 )    Color: Yellow / Appearance: Clear / S.010 / pH: x  Gluc: x / Ketone: Negative  / Bili: Negative / Urobili: Negative   Blood: x / Protein: 30 mg/dL / Nitrite: Negative   Leuk Esterase: Small / RBC: 2-5 /HPF / WBC 3-5 /HPF   Sq Epi: x / Non Sq Epi: Few /HPF / Bacteria: Few /HPF      REVIEW OF SYSTEMS:  CONSTITUTIONAL: No fever, weight loss, or fatigue  EYES: No eye pain, visual disturbances, or discharge  ENMT:  No difficulty hearing, tinnitus, vertigo; No sinus or throat pain  NECK: No pain or stiffness  BREASTS: No pain, masses, or nipple discharge  RESPIRATORY: No cough, wheezing, chills or hemoptysis; No shortness of breath  CARDIOVASCULAR: No chest pain, palpitations, dizziness, or leg swelling  GASTROINTESTINAL: No abdominal or epigastric pain. No nausea, vomiting, or hematemesis; No diarrhea or constipation. No melena or hematochezia.  GENITOURINARY: No dysuria, frequency, hematuria, or incontinence  NEUROLOGICAL: No headaches, memory loss, loss of strength, numbness, or tremors      RADIOLOGY & ADDITIONAL TESTS:    Consultant(s) Notes Reviewed:  [x ] YES  [ ] NO    PHYSICAL EXAM:  GENERAL: NAD,NRB   HEAD:  Atraumatic, Normocephalic  EYES: EOMI, PERRLA, conjunctiva and sclera clear  ENMT: No tonsillar erythema, exudates, or enlargement; Moist mucous membranes, Good dentition, No lesions  NECK: Supple, No JVD, Normal thyroid  NERVOUS SYSTEM:  Alert & Oriented X3, No focal deficit   CHEST/LUNG: Good air entry bilateral with no  rales, rhonchi, wheezing, or rubs  HEART: Regular rate and rhythm; No murmurs, rubs, or gallops  ABDOMEN: Soft, Nontender, Nondistended; Bowel sounds present  EXTREMITIES:  2+ Peripheral Pulses, No clubbing, cyanosis, or edema  SKIN: No rashes or lesions    Care Discussed with Consultants/Other Providers [ x] YES  [ ] NO

## 2020-04-12 NOTE — PROGRESS NOTE ADULT - ASSESSMENT
87M from home, [place] speaking,  ambulating with walker with PMH of COPD (EMPHYSEMA), Atrial fib, Borderline HTN, comes to the ED with complaints of shortness of breath. Pt was apparently normal till a week ago when he first developed fever and dry cough. He visited his Primary care and was presribed  Azithromycin and Hcq. He took the Azithromycin for 3 days. His symptoms had not resolved. His oxygen saturations at the home pulse oximetry was noted to be running low between 75-80% that concerned him to visit  the hospital. His 87y wife is also endorsing similar symptoms with lesser intensity. She is also been admitted for the Covid r/o.   Pt admitted for R/O COVID  DNR/DNI     Problem/Plan - 1:  ·  Problem: Acute Respiratory failure with hypoxia.  Plan: Sec to COVID 19 . Solumedrol added. p/w SOB, cough   - WBC:mild elevation 10k   - CXR -- b/l pneumonia likely 2/2 viral pna   - Pt on NRB, SPO2 97%, no signs of distress noted,  -Started on plaquenil.   -on contact and airborne isolation precaution   f/u Covid-19 results  - LDH, Procalcitonin, ferritin   - Tylenol, Albuterol Inhaler, Robitussin PRN.      Problem/Plan - 2:  ·  Problem: Atrial fibrillation.  Plan: Pt has PMH of atrial fibrillation  on telemetry monitoring  c/w home medication xarelto and metoprolol.      Problem/Plan - 3:  ·  Problem: SUNG (acute kidney injury).  Plan: On admission creatinine levels elevated  1.3( baseline unknown)  s/p NS bolus in the ED   C/W IV hydration  f/u BMP am.      Problem/Plan - 4:  ·  Problem: Viral pneumonia.  Plan: same as above  pt completed OP course of Azithrox3 days.      Problem/Plan - 5:  ·  Problem: COPD (chronic obstructive pulmonary disease).  Plan: pt has known PMH of COPD  no acute exacerbration  c/w albuterol inhalation prn.      Problem/Plan - 6:  Problem: Goals of care, counseling/discussion. Plan: GOC discussion done with daughter Miguel Mccoy- [phone]  Pt poor prognosis explained.  Daughter accepted for the DNR/DNI.     Problem/Plan - 7:  ·  Problem: Prophylactic measure.  Plan: IMPROVE VTE Individual Risk Assessmen                                               1    IMPROVE VTE Score: 2  Pt on xarelto 20mg od.

## 2020-04-13 NOTE — PROGRESS NOTE ADULT - PROBLEM SELECTOR PLAN 1
Secondary to COVID -19  Continue Plaquenil and Solumedrol  Continue supplemental oxygen  Continue Airborne isolation precautions

## 2020-04-13 NOTE — DISCHARGE NOTE PROVIDER - NSDCMRMEDTOKEN_GEN_ALL_CORE_FT
Breo Ellipta 100 mcg-25 mcg/inh inhalation powder: 1 puff(s) inhaled once a day  latanoprost 0.005% ophthalmic solution: 1 drop(s) to each affected eye once a day (in the evening)  Metoprolol Succinate ER 25 mg oral tablet, extended release: 1 tab(s) orally once a day  Xarelto 20 mg oral tablet: 1 tab(s) orally once a day (in the evening)

## 2020-04-13 NOTE — PROGRESS NOTE ADULT - SUBJECTIVE AND OBJECTIVE BOX
Medicine Progress Note    Patient is a 87y old  Male who presents with a chief complaint of shortness of breath (12 Apr 2020 12:02)      SUBJECTIVE / OVERNIGHT EVENTS: Patient in NAD      MEDICATIONS  (STANDING):  hydroxychloroquine 400 milliGRAM(s) Oral every 24 hours  hydroxychloroquine   Oral   latanoprost 0.005% Ophthalmic Solution 1 Drop(s) Both EYES at bedtime  methylPREDNISolone sodium succinate Injectable 40 milliGRAM(s) IV Push two times a day  rivaroxaban 15 milliGRAM(s) Oral with dinner  sodium chloride 0.9%. 1000 milliLiter(s) (65 mL/Hr) IV Continuous <Continuous>            I&O's Summary    12 Apr 2020 07:01  -  13 Apr 2020 07:00  --------------------------------------------------------  IN: 0 mL / OUT: 100 mL / NET: -100 mL        PHYSICAL EXAM:  Vital Signs Last 24 Hrs  T(C): 36.3 (13 Apr 2020 05:45), Max: 36.7 (12 Apr 2020 19:49)  T(F): 97.4 (13 Apr 2020 05:45), Max: 98.1 (12 Apr 2020 19:49)  HR: 96 (13 Apr 2020 05:45) (96 - 100)  BP: 136/85 (13 Apr 2020 05:45) (106/93 - 149/102)  BP(mean): --  RR: 20 (13 Apr 2020 05:45) (20 - 22)  SpO2: 99% (13 Apr 2020 05:45) (94% - 99%)        CONSTITUTIONAL: NAD, well-developed, well-groomed  ENMT: Moist oral mucosa  RESPIRATORY: Diminished bilaterally to bases  CARDIOVASCULAR: Regular rate and rhythm No lower extremity edema; Peripheral pulses are 2+ bilaterally  ABDOMEN: Nontender to palpation, normoactive bowel sounds, no rebound/guarding; No hepatosplenomegaly    LABS:                        13.3   12.57 )-----------( 230      ( 13 Apr 2020 09:11 )             42.1     04-13    145  |  112<H>  |  31<H>  ----------------------------<  196<H>  3.4<L>   |  26  |  1.32<H>    Ca    8.4      13 Apr 2020 09:11  Phos  2.1     04-13  Mg     2.8     04-13                COVID-19 PCR: Detected (10 Apr 2020 13:28)

## 2020-04-13 NOTE — CONSULT NOTE ADULT - SUBJECTIVE AND OBJECTIVE BOX
HISTORY OF PRESENT ILLNESS: HPI:  87M from home, [place] speaking,  ambulating with walker with PMH of COPD (EMPHYSEMA), Atrial fib, Borderline HTN, comes to the ED with complaints of shortness of breath. Pt was apparently normal till a week ago when he first developed fever and dry cough. He visited his Primary care and was presribed  Azithromycin and Hcq. He took the Azithromycin for 3 days. His symptoms had not resolved. His oxygen saturations at the home pulse oximetry was noted to be running low between 75-80% that concerned him to visit  the hospital. His 87y wife is also endorsing similar symptoms with lesser intensity. She is also been admitted for the Covid r/o.   History obtained over phone from the daughter Miguel Mccoy- [phone].     In the ED,   Pt on the  NRB, sitting comfortably  WBC elevated 10.6k  Creat 1.3. s/p IVF  VItals- afebrile, /67  RR- 24, SPO2- 97% on NRB    DNR/DNI (10 Apr 2020 15:45)      PAST MEDICAL & SURGICAL HISTORY:  History of COPD  Atrial fibrillation          MEDICATIONS:  MEDICATIONS  (STANDING):  hydroxychloroquine 400 milliGRAM(s) Oral every 24 hours  hydroxychloroquine   Oral   latanoprost 0.005% Ophthalmic Solution 1 Drop(s) Both EYES at bedtime  methylPREDNISolone sodium succinate Injectable 40 milliGRAM(s) IV Push two times a day  rivaroxaban 15 milliGRAM(s) Oral with dinner  sodium chloride 0.9%. 1000 milliLiter(s) (65 mL/Hr) IV Continuous <Continuous>      Allergies    No Known Allergies    Intolerances        FAMILY HISTORY:    Non-contributary for premature coronary disease or sudden cardiac death    SOCIAL HISTORY:    [X ] Non-smoker  [ ] Smoker  [ ] Alcohol        REVIEW OF SYSTEMS:  [ ]chest pain  [X ]shortness of breath  [  ]palpitations  [  ]syncope  [ ]near syncope [ ]upper extremity weakness   [ ] lower extremity weakness  [  ]diplopia  [  ]altered mental status   [  ]fevers  [ ]chills [ ]nausea  [ ]vomitting  [  ]dysphagia    [ ]abdominal pain  [ ]melena  [ ]BRBPR    [  ]epistaxis  [  ]rash    [ ]lower extremity edema        [ x] All others negative	  [ ] Unable to obtain      LABS:	 	    CARDIAC MARKERS:                              13.3   12.57 )-----------( 230      ( 13 Apr 2020 09:11 )             42.1     Hb Trend: 13.3<--    04-13    145  |  112<H>  |  31<H>  ----------------------------<  196<H>  3.4<L>   |  26  |  1.32<H>    Ca    8.4      13 Apr 2020 09:11  Phos  2.1     04-13  Mg     2.8     04-13      Creatinine Trend: 1.32<--, 1.01<--, 1.00<--, 1.39<--    PHYSICAL EXAM:  T(C): 36.3 (04-13-20 @ 14:17), Max: 36.7 (04-12-20 @ 19:49)  HR: 82 (04-13-20 @ 14:17) (82 - 100)  BP: 178/93 (04-13-20 @ 14:17) (106/93 - 178/93)  RR: 20 (04-13-20 @ 14:17) (20 - 22)  SpO2: 95% (04-13-20 @ 14:17) (94% - 99%)  Wt(kg): --   BMI (kg/m2): 24.3 (04-10-20 @ 12:05)  I&O's Summary    12 Apr 2020 07:01  -  13 Apr 2020 07:00  --------------------------------------------------------  IN: 0 mL / OUT: 100 mL / NET: -100 mL      HEENT:  (-)icterus (-)pallor  CV: N S1 S2 1/6 JA (+)2 Pulses B/l  Resp:  Clear to ausculatation B/L, normal effort  GI: (+) BS Soft, NT, ND  Lymph:  (-)Edema, (-)obvious lymphadenopathy  Skin: Warm to touch, Normal turgor  Psych: Appropriate mood and affect      ECG:  	PENDING NOT IN CHART    RADIOLOGY:         CXR:  Bilateral lung parenchymal airspace opacities suggesting bilateral pneumonia.      ASSESSMENT/PLAN: 	87y Male  PMH of COPD (EMPHYSEMA), Atrial fib, Borderline HTN, comes to the ED with complaints of shortness of breath B/L PNA on xray COVID(+)    - F/U EKG on hydroxychlorquine to monitor QTC  - HR appropriate cont xarelto  - Steroids per primary team    I once again thank you for allowing me to participate in the care of your patient.  If you have any questions or concerns please do not hesitate to contact me.    Olegario Tafoya MD, [facility]  BEEPER [phone]

## 2020-04-13 NOTE — DISCHARGE NOTE PROVIDER - CARE PROVIDER_API CALL
Shweta Real)  [address], [address] [place] [address]  Phone: [phone]  Fax: [phone]  Follow Up Time:

## 2020-04-13 NOTE — PROGRESS NOTE ADULT - SUBJECTIVE AND OBJECTIVE BOX
INTERVAL HPI/OVERNIGHT EVENTS: No new concerns. Still SOB .  Vital Signs Last 24 Hrs  T(C): 36.3 (13 Apr 2020 14:17), Max: 36.7 (12 Apr 2020 19:49)  T(F): 97.3 (13 Apr 2020 14:17), Max: 98.1 (12 Apr 2020 19:49)  HR: 112 (13 Apr 2020 17:42) (82 - 112)  BP: 164/89 (13 Apr 2020 17:42) (106/93 - 178/93)  BP(mean): --  RR: 20 (13 Apr 2020 14:17) (20 - 22)  SpO2: 95% (13 Apr 2020 14:17) (94% - 99%)  I&O's Summary    12 Apr 2020 07:01  -  13 Apr 2020 07:00  --------------------------------------------------------  IN: 0 mL / OUT: 100 mL / NET: -100 mL      MEDICATIONS  (STANDING):  hydroxychloroquine 400 milliGRAM(s) Oral every 24 hours  hydroxychloroquine   Oral   latanoprost 0.005% Ophthalmic Solution 1 Drop(s) Both EYES at bedtime  methylPREDNISolone sodium succinate Injectable 40 milliGRAM(s) IV Push two times a day  potassium phosphate IVPB 15 milliMole(s) IV Intermittent once  rivaroxaban 15 milliGRAM(s) Oral with dinner  sodium chloride 0.9%. 1000 milliLiter(s) (65 mL/Hr) IV Continuous <Continuous>    MEDICATIONS  (PRN):    LABS:                        13.3   12.57 )-----------( 230      ( 13 Apr 2020 09:11 )             42.1     04-13    145  |  112<H>  |  31<H>  ----------------------------<  196<H>  3.4<L>   |  26  |  1.32<H>    Ca    8.4      13 Apr 2020 09:11  Phos  2.1     04-13  Mg     2.8     04-13          CAPILLARY BLOOD GLUCOSE                  RADIOLOGY & ADDITIONAL TESTS:    Consultant(s) Notes Reviewed:  [x ] YES  [ ] NO    PHYSICAL EXAM:  GENERAL: NAD, NRB   HEAD:  Atraumatic, Normocephalic  EYES: EOMI, PERRLA, conjunctiva and sclera clear  ENMT: No tonsillar erythema, exudates, or enlargement; Moist mucous membranes, Good dentition, No lesions  NECK: Supple, No JVD, Normal thyroid  NERVOUS SYSTEM:  Alert & No focal deficit   CHEST/LUNG: Good air entry bilateral with no  rales, rhonchi, wheezing, or rubs  HEART: Regular rate and rhythm; No murmurs, rubs, or gallops  ABDOMEN: Soft, Nontender, Nondistended; Bowel sounds present  EXTREMITIES:  2+ Peripheral Pulses, No clubbing, cyanosis, or edema  SKIN: No rashes or lesions    Care Discussed with Consultants/Other Providers [ x] YES  [ ] NO

## 2020-04-13 NOTE — DISCHARGE NOTE PROVIDER - NSDCCPCAREPLAN_GEN_ALL_CORE_FT
PRINCIPAL DISCHARGE DIAGNOSIS  Diagnosis: Viral pneumonia  Assessment and Plan of Treatment: CORONAVIRUS INSTRUCTIONS:   Based on your current clinical status and stability, it has been determined that you no longer need hospitalization and can recover while remaining in self-quarantine at home. You should follow the prevention steps below until a healthcare provider or local or state health department says you can return to your normal activities.   1. You should restrict activities outside your home, except for getting medical care.   2. Do not go to work, school, or public areas.   3. Avoid using public transportation, ride-sharing, or taxis.   4. Separate yourself from other people and animals in your home as much as possible.  When you are around other people (e.g., sharing a room or vehicle) you should wear a facemask.  5. Wash your hands often with soap and water for at least 20 seconds, especially after blowing your nose, coughing, or sneezing; going to the bathroom; and before eating or preparing food.  6. Cover your mouth and nose with a tissue when you cough or sneeze. Throw used tissues in a lined trash can. Immediately wash your hands with soap and water for at least 20 seconds  7. High touch surfaces include counters, tabletops, doorknobs, bathroom fixtures, toilets, phones, keyboards, tablets, and bedside tables.  8. Avoid sharing dishes, drinking glasses, cups, eating utensils, towels, or bedding with other people or pets in your home. After using these items, they should be washed thoroughly with soap and water.  You are strongly advised to seek prompt medical attention if your illness worsens or you develop new symptoms like fever or difficulty breathing.   Please call  [phone] to speak with your discharging physician if you have any questions.        SECONDARY DISCHARGE DIAGNOSES  Diagnosis: Viral pneumonia  Assessment and Plan of Treatment:

## 2020-04-13 NOTE — DISCHARGE NOTE PROVIDER - HOSPITAL COURSE
87M from home, [place] speaking,  ambulating with walker with PMH of COPD (EMPHYSEMA), Atrial fib, Borderline HTN, comes to the ED with complaints of shortness of breath. Pt was apparently normal till a week ago when he first developed fever and dry cough. He visited his Primary care and was presribed  Azithromycin and Hcq. He took the Azithromycin for 3 days. His symptoms had not resolved. His oxygen saturations at the home pulse oximetry was noted to be running low between 75-80% that concerned him to visit  the hospital. Chest X ray showed Bilateral lung parenchymal airspace opacities suggesting bilateral pneumonia. COVID 19 PCR turned out to be positive. Treated with Plaquenil , Solumedrol.

## 2020-04-13 NOTE — PROGRESS NOTE ADULT - ASSESSMENT
87M from home, [place] speaking,  ambulating with walker with PMH of COPD (EMPHYSEMA), Atrial fib, Borderline HTN, comes to the ED with complaints of shortness of breath. Pt was apparently normal till a week ago when he first developed fever and dry cough. He visited his Primary care and was presribed  Azithromycin and Hcq. He took the Azithromycin for 3 days. His symptoms had not resolved. His oxygen saturations at the home pulse oximetry was noted to be running low between 75-80% that concerned him to visit  the hospital. His 87y wife is also endorsing similar symptoms with lesser intensity. She is also been admitted for the Covid r/o.   Pt admitted for R/O COVID  DNR/DNI     Problem/Plan - 1:  ·  Problem: Acute Respiratory failure with hypoxia.  Plan: Sec to COVID 19 . Solumedrol added. p/w SOB, cough   - WBC:mild elevation 10k   - CXR -- b/l pneumonia likely 2/2 viral pna   - Pt on NRB,   -Started on plaquenil.   -on contact and airborne isolation precaution   f/u Covid-19 results  - LDH, Procalcitonin, ferritin   - Tylenol, Albuterol Inhaler, Robitussin PRN.      Problem/Plan - 2:  ·  Problem: Atrial fibrillation.  Plan: Pt has PMH of atrial fibrillation  on telemetry monitoring  c/w home medication xarelto and metoprolol.      Problem/Plan - 3:  ·  Problem: SUNG (acute kidney injury).  Plan: On admission creatinine levels elevated  1.3( baseline unknown)  s/p NS bolus in the ED   C/W IV hydration  f/u BMP am.      Problem/Plan - 4:  ·  Problem: Viral pneumonia.  Plan: same as above  pt completed OP course of Azithrox3 days.      Problem/Plan - 5:  ·  Problem: COPD (chronic obstructive pulmonary disease).  Plan: pt has known PMH of COPD  no acute exacerbration  c/w albuterol inhalation prn.      Problem/Plan - 6:  Problem: Goals of care, counseling/discussion. Plan: GOC discussion done with daughter Miguel Mccoy- [phone]  Pt poor prognosis explained.  Daughter accepted for the DNR/DNI.     Problem/Plan - 7:  ·  Problem: Prophylactic measure.  Plan: IMPROVE VTE Individual Risk Assessmen                                               1    IMPROVE VTE Score: 2  Pt on xarelto 20mg od.

## 2020-04-14 NOTE — PROGRESS NOTE ADULT - SUBJECTIVE AND OBJECTIVE BOX
INTERVAL HPI/OVERNIGHT EVENTS: Still SOB   Vital Signs Last 24 Hrs  T(C): 37.3 (14 Apr 2020 18:18), Max: 37.3 (14 Apr 2020 18:18)  T(F): 99.1 (14 Apr 2020 18:18), Max: 99.1 (14 Apr 2020 18:18)  HR: 69 (14 Apr 2020 18:18) (46 - 95)  BP: 118/66 (14 Apr 2020 18:18) (118/66 - 185/105)  BP(mean): --  RR: 24 (14 Apr 2020 18:18) (20 - 28)  SpO2: 92% (14 Apr 2020 18:18) (92% - 100%)  I&O's Summary    MEDICATIONS  (STANDING):  anakinra Injectable 100 milliGRAM(s) SubCutaneous every 6 hours  hydroxychloroquine 400 milliGRAM(s) Oral every 24 hours  hydroxychloroquine   Oral   latanoprost 0.005% Ophthalmic Solution 1 Drop(s) Both EYES at bedtime  methylPREDNISolone sodium succinate Injectable 40 milliGRAM(s) IV Push two times a day  metoprolol succinate ER 25 milliGRAM(s) Oral daily  rivaroxaban 15 milliGRAM(s) Oral with dinner  sodium chloride 0.9%. 1000 milliLiter(s) (65 mL/Hr) IV Continuous <Continuous>    MEDICATIONS  (PRN):  HYDROmorphone  Injectable 0.5 milliGRAM(s) IV Push every 4 hours PRN dyspnea  LORazepam   Injectable 1 milliGRAM(s) IV Push every 4 hours PRN Agitation    LABS:                        13.6   14.32 )-----------( 153      ( 14 Apr 2020 05:13 )             41.9     04-14    147<H>  |  112<H>  |  32<H>  ----------------------------<  156<H>  4.1   |  31  |  1.23    Ca    8.8      14 Apr 2020 05:13  Phos  2.1     04-13  Mg     2.8     04-13    TPro  7.4  /  Alb  2.9<L>  /  TBili  1.0  /  DBili  x   /  AST  41<H>  /  ALT  56  /  AlkPhos  130<H>  04-14        CAPILLARY BLOOD GLUCOSE                Consultant(s) Notes Reviewed:  [x ] YES  [ ] NO    PHYSICAL EXAM:  GENERAL: NAD, NRB   HEAD:  Atraumatic, Normocephalic  EYES: EOMI, PERRLA, conjunctiva and sclera clear  ENMT: No tonsillar erythema, exudates, or enlargement; Moist mucous membranes, Good dentition, No lesions  NECK: Supple, No JVD, Normal thyroid  NERVOUS SYSTEM:  Alert &  No focal deficit   CHEST/LUNG: Good air entry bilateral with no  rales, rhonchi, wheezing, or rubs  HEART: Regular rate and rhythm; No murmurs, rubs, or gallops  ABDOMEN: Soft, Nontender, Nondistended; Bowel sounds present  EXTREMITIES:  2+ Peripheral Pulses, No clubbing, cyanosis, or edema    Care Discussed with Consultants/Other Providers [ x] YES  [ ] NO

## 2020-04-14 NOTE — GOALS OF CARE CONVERSATION - ADVANCED CARE PLANNING - TREATMENT GUIDELINES
Comfort measures only/DNI/No blood draws/No artificial nutrition/IV fluid trial/DNR Order/Antibiotic trial

## 2020-04-14 NOTE — PROGRESS NOTE ADULT - PROBLEM SELECTOR PLAN 1
Secondary to COVID -19  Continue Plaquenil and Solumedrol  Will add Anakinra to the regimen per attending  Continue supplemental oxygen  Continue Airborne isolation precautions

## 2020-04-14 NOTE — PROGRESS NOTE ADULT - PROBLEM SELECTOR PLAN 2
Continue Xarelto  Patient's BP in the 160's. Discussed with Attending, will restart on home medication Metoprolol XR 25 mg daily  Monitor vital signs

## 2020-04-14 NOTE — GOALS OF CARE CONVERSATION - ADVANCED CARE PLANNING - CONVERSATION DETAILS
PC  and PC NP contacted the patient's family.  Finn stated they are making decisions with the medical team as their mother is currently admitted to the hospital as well.  Patient's granddaughter was conferenced in [facility], as per the family's request. Pc NP provided a clinical summary and educated the family regarding the risks versus benefits of CPR and intubated in the setting of advanced illness and Covid 19.   Patient already has a DNR/DNI on file and the family has requested comfort measures at this time including continuation of antibiotics, trial of IV fluids, no labs. They stated they do not want  the patient to suffer and requested he be kept comfortable.  Emotional support was provided as well as the contact information for both NP and .

## 2020-04-14 NOTE — PROGRESS NOTE ADULT - SUBJECTIVE AND OBJECTIVE BOX
Patient is a 87y old  Male who presents with a chief complaint of shortness of breath (12 Apr 2020 12:02)      SUBJECTIVE / OVERNIGHT EVENTS: Patient in NAD. However patient occasionally getting confused , trying to take NR mask off, getting out of the room.      Vital Signs Last 24 Hrs  T(C): 36.4 (14 Apr 2020 05:42), Max: 36.4 (13 Apr 2020 20:40)  T(F): 97.6 (14 Apr 2020 05:42), Max: 97.6 (14 Apr 2020 05:42)  HR: 95 (14 Apr 2020 05:42) (46 - 112)  BP: 160/87 (14 Apr 2020 05:42) (144/68 - 185/105)  BP(mean): --  RR: 20 (14 Apr 2020 05:42) (20 - 24)  SpO2: 100% (14 Apr 2020 05:42) (95% - 100%)      PHYSICAL EXAM    CONSTITUTIONAL: NAD, well-developed, well-groomed  ENMT: Moist oral mucosa  RESPIRATORY: Diminished bilaterally . On a Non rebreather  CARDIOVASCULAR: Regular rate and rhythm No lower extremity edema; Peripheral pulses are 2+ bilaterally  ABDOMEN: Nontender to palpation, normoactive bowel sounds        LABS:  cret                        13.6   14.32 )-----------( 153      ( 14 Apr 2020 05:13 )             41.9     04-14    147<H>  |  112<H>  |  32<H>  ----------------------------<  156<H>  4.1   |  31  |  1.23    Ca    8.8      14 Apr 2020 05:13  Phos  2.1     04-13  Mg     2.8     04-13    TPro  7.4  /  Alb  2.9<L>  /  TBili  1.0  /  DBili  x   /  AST  41<H>  /  ALT  56  /  AlkPhos  130<H>  04-14    COVID-19 PCR: Detected (10 Apr 2020 13:28)      MEDICATIONS  (STANDING):  anakinra Injectable   SubCutaneous   haloperidol    Injectable 1 milliGRAM(s) IntraMuscular once  hydroxychloroquine 400 milliGRAM(s) Oral every 24 hours  hydroxychloroquine   Oral   latanoprost 0.005% Ophthalmic Solution 1 Drop(s) Both EYES at bedtime  methylPREDNISolone sodium succinate Injectable 40 milliGRAM(s) IV Push two times a day  metoprolol succinate ER 25 milliGRAM(s) Oral daily  rivaroxaban 15 milliGRAM(s) Oral with dinner  sodium chloride 0.9%. 1000 milliLiter(s) (65 mL/Hr) IV Continuous <Continuous>

## 2020-04-14 NOTE — PROGRESS NOTE ADULT - ASSESSMENT
87M from home, [place] speaking,  ambulating with walker with PMH of COPD (EMPHYSEMA), Atrial fib, Borderline HTN, comes to the ED with complaints of shortness of breath. Pt was apparently normal till a week ago when he first developed fever and dry cough. He visited his Primary care and was presribed  Azithromycin and Hcq. He took the Azithromycin for 3 days. His symptoms had not resolved. His oxygen saturations at the home pulse oximetry was noted to be running low between 75-80% that concerned him to visit  the hospital. His 87y wife is also endorsing similar symptoms with lesser intensity. She is also been admitted for the Covid r/o.   Pt admitted for R/O COVID  DNR/DNI     Problem/Plan - 1:  ·  Problem: Acute Respiratory failure with hypoxia.  Plan: Sec to COVID 19 . Solumedrol added. p/w SOB, cough   - WBC:mild elevation 10k   - CXR -- b/l pneumonia likely 2/2 viral pna   - Pt on NRB,   -Started on plaquenil.   -on contact and airborne isolation precaution   f/u Covid-19 results  - LDH, Procalcitonin, ferritin   - Tylenol, Albuterol Inhaler, Robitussin PRN.      Problem/Plan - 2:  ·  Problem: Atrial fibrillation.  Plan: Pt has PMH of atrial fibrillation  on telemetry monitoring  c/w home medication xarelto and metoprolol.      Problem/Plan - 3:  ·  Problem: SUNG (acute kidney injury).  Plan: On admission creatinine levels elevated  1.3( baseline unknown)  s/p NS bolus in the ED   C/W IV hydration  f/u BMP am.      Problem/Plan - 4:  ·  Problem: Viral pneumonia.  Plan: same as above  pt completed OP course of Azithrox3 days.      Problem/Plan - 5:  ·  Problem: COPD (chronic obstructive pulmonary disease).  Plan: pt has known PMH of COPD  no acute exacerbration  c/w albuterol inhalation prn.      Problem/Plan - 6:  Problem: Goals of care, counseling/discussion. Plan: GOC discussion done with daughter Miguel Mccoy- [phone]  Pt poor prognosis explained.  Daughter accepted for the DNR/DNI.     Problem/Plan - 7:  ·  Problem: Prophylactic measure.  Plan: IMPROVE VTE Individual Risk Assessmen                                               1    IMPROVE VTE Score: 2  Pt on xarelto 20mg od.       Palliative consulted. Over all prognosis .

## 2020-04-14 NOTE — CHART NOTE - NSCHARTNOTEFT_GEN_A_CORE
Palliative Care:    87[facility] [place] home[facility],  ambulating with walker with PMH of COPD (EMPHYSEMA), Atrial fib, Borderline HTN, presents to the ED with complaints of shortness of breath.    Pt is agitated on NRB 96% RR28.  CXR:  Bilateral lung parenchymal airspace opacities suggesting bilateral pneumonia. COVID 19 positive.  C/w Plaquenil, solumedrol and anakinra. DNR/DNI on file.   Spoke with the patient's children Domonique Paez via phone ( [phone]), updated them on the pt's progressive decline and offered comfort care at this time.    They expressed understanding and Palliative Care:    87M from home, [place] speaking,  ambulating with walker with PMH of COPD (EMPHYSEMA), Atrial fib, Borderline HTN, presents to the ED with complaints of shortness of breath.    Pt is agitated on NRB 96% RR28.  CXR:  Bilateral lung parenchymal airspace opacities suggesting bilateral pneumonia. COVID 19 positive.  C/w Plaquenil, solumedrol and anakinra. DNR/DNI on file.   Patient is confused, refusing to keep NRB on.    Spoke with the patient's children Domonique Paez ans well as Steve Nesbitt via phone ( [phone]), updated them on the pt's progressive decline and offered comfort care at this time.  They are agreeable for comfort care/continue antibiotics/trial of  IVF/no further labs. Support provided.

## 2020-04-14 NOTE — PROGRESS NOTE ADULT - SUBJECTIVE AND OBJECTIVE BOX
Events noted, patient confused.  Refusing NRB ROS unobtainable   	  MEDICATIONS:  MEDICATIONS  (STANDING):  anakinra Injectable 100 milliGRAM(s) SubCutaneous every 6 hours  HYDROmorphone  Injectable 0.5 milliGRAM(s) IV Push once  hydroxychloroquine 400 milliGRAM(s) Oral every 24 hours  hydroxychloroquine   Oral   latanoprost 0.005% Ophthalmic Solution 1 Drop(s) Both EYES at bedtime  methylPREDNISolone sodium succinate Injectable 40 milliGRAM(s) IV Push two times a day  metoprolol succinate ER 25 milliGRAM(s) Oral daily  rivaroxaban 15 milliGRAM(s) Oral with dinner  sodium chloride 0.9%. 1000 milliLiter(s) (65 mL/Hr) IV Continuous <Continuous>      LABS:	 	    CARDIAC MARKERS:                                13.6   14.32 )-----------( 153      ( 14 Apr 2020 05:13 )             41.9     Hemoglobin: 13.6 g/dL (04-14 @ 05:13)  Hemoglobin: 13.3 g/dL (04-13 @ 09:11)  Hemoglobin: 12.9 g/dL (04-12 @ 06:56)  Hemoglobin: 15.4 g/dL (04-11 @ 05:41)  Hemoglobin: 15.3 g/dL (04-10 @ 13:28)      04-14    147<H>  |  112<H>  |  32<H>  ----------------------------<  156<H>  4.1   |  31  |  1.23    Ca    8.8      14 Apr 2020 05:13  Phos  2.1     04-13  Mg     2.8     04-13    TPro  7.4  /  Alb  2.9<L>  /  TBili  1.0  /  DBili  x   /  AST  41<H>  /  ALT  56  /  AlkPhos  130<H>  04-14    Creatinine Trend: 1.23<--, 1.32<--, 1.01<--, 1.00<--, 1.39<--        PHYSICAL EXAM:  T(C): 36.8 (04-14-20 @ 14:23), Max: 36.8 (04-14-20 @ 14:23)  HR: 61 (04-14-20 @ 14:23) (46 - 112)  BP: 155/99 (04-14-20 @ 14:23) (132/99 - 185/105)  RR: 22 (04-14-20 @ 14:23) (20 - 28)  SpO2: 100% (04-14-20 @ 14:23) (96% - 100%)  Wt(kg): --  I&O's Summary    HEENT:  (-)icterus (-)pallor  CV: N S1 S2 1/6 JA (+)2 Pulses B/l  Resp:  Clear to ausculatation B/L, normal effort  GI: (+) BS Soft, NT, ND  Lymph:  (-)Edema, (-)obvious lymphadenopathy  Skin: Warm to touch, Normal turgor  Psych: Appropriate mood and affect        ASSESSMENT/PLAN: 	87y  Male H of COPD (EMPHYSEMA), Atrial fib, Borderline HTN, comes to the ED with complaints of shortness of breath B/L PNA on xray COVID(+)    - HR appropriate  - cont xarelto  - Steroids, anikara and Plaquenil per med per primary team  - Family leaning towards comfort care    Olegario Tafoya MD, FACC  BEEPER [phone] Events noted, patient confused.  Refusing NRB ROS unobtainable   	  MEDICATIONS:  MEDICATIONS  (STANDING):  anakinra Injectable 100 milliGRAM(s) SubCutaneous every 6 hours  HYDROmorphone  Injectable 0.5 milliGRAM(s) IV Push once  hydroxychloroquine 400 milliGRAM(s) Oral every 24 hours  hydroxychloroquine   Oral   latanoprost 0.005% Ophthalmic Solution 1 Drop(s) Both EYES at bedtime  methylPREDNISolone sodium succinate Injectable 40 milliGRAM(s) IV Push two times a day  metoprolol succinate ER 25 milliGRAM(s) Oral daily  rivaroxaban 15 milliGRAM(s) Oral with dinner  sodium chloride 0.9%. 1000 milliLiter(s) (65 mL/Hr) IV Continuous <Continuous>      LABS:	 	    CARDIAC MARKERS:                                13.6   14.32 )-----------( 153      ( 14 Apr 2020 05:13 )             41.9     Hemoglobin: 13.6 g/dL (04-14 @ 05:13)  Hemoglobin: 13.3 g/dL (04-13 @ 09:11)  Hemoglobin: 12.9 g/dL (04-12 @ 06:56)  Hemoglobin: 15.4 g/dL (04-11 @ 05:41)  Hemoglobin: 15.3 g/dL (04-10 @ 13:28)      04-14    147<H>  |  112<H>  |  32<H>  ----------------------------<  156<H>  4.1   |  31  |  1.23    Ca    8.8      14 Apr 2020 05:13  Phos  2.1     04-13  Mg     2.8     04-13    TPro  7.4  /  Alb  2.9<L>  /  TBili  1.0  /  DBili  x   /  AST  41<H>  /  ALT  56  /  AlkPhos  130<H>  04-14    Creatinine Trend: 1.23<--, 1.32<--, 1.01<--, 1.00<--, 1.39<--        PHYSICAL EXAM:  T(C): 36.8 (04-14-20 @ 14:23), Max: 36.8 (04-14-20 @ 14:23)  HR: 61 (04-14-20 @ 14:23) (46 - 112)  BP: 155/99 (04-14-20 @ 14:23) (132/99 - 185/105)  RR: 22 (04-14-20 @ 14:23) (20 - 28)  SpO2: 100% (04-14-20 @ 14:23) (96% - 100%)  Wt(kg): --  I&O's Summary    HEENT:  (-)icterus (-)pallor  CV: N S1 S2 1/6 JA (+)2 Pulses B/l  Resp:  Clear to ausculatation B/L, normal effort  GI: (+) BS Soft, NT, ND  Lymph:  (-)Edema, (-)obvious lymphadenopathy  Skin: Warm to touch, Normal turgor  Psych: Unable to adequately assess  mood and affect        ASSESSMENT/PLAN: 	87y  Male H of COPD (EMPHYSEMA), Atrial fib, Borderline HTN, comes to the ED with complaints of shortness of breath B/L PNA on xray COVID(+)    - HR appropriate  - cont xarelto  - Steroids, anikara and Plaquenil per med per primary team  - Family leaning towards comfort care    Olegario Tafoya MD, [facility]  BEEPER [phone]

## 2020-04-15 NOTE — CHART NOTE - NSCHARTNOTEFT_GEN_A_CORE
Spoke with patient's son Mr. Mccoy at [phone]. Updated son on patient status. All questions and concerns addressed.

## 2020-04-15 NOTE — PROGRESS NOTE ADULT - SUBJECTIVE AND OBJECTIVE BOX
INTERVAL HPI/OVERNIGHT EVENTS: SOB   Vital Signs Last 24 Hrs  T(C): 36.3 (15 Apr 2020 11:39), Max: 36.3 (14 Apr 2020 21:11)  T(F): 97.3 (15 Apr 2020 11:39), Max: 97.4 (14 Apr 2020 21:11)  HR: 104 (15 Apr 2020 11:39) (62 - 104)  BP: 148/72 (15 Apr 2020 11:39) (133/93 - 148/72)  BP(mean): --  RR: 26 (15 Apr 2020 11:39) (20 - 26)  SpO2: 97% (15 Apr 2020 11:39) (91% - 97%)  I&O's Summary    MEDICATIONS  (STANDING):  anakinra Injectable 100 milliGRAM(s) SubCutaneous every 6 hours  latanoprost 0.005% Ophthalmic Solution 1 Drop(s) Both EYES at bedtime  methylPREDNISolone sodium succinate Injectable 40 milliGRAM(s) IV Push two times a day  metoprolol succinate ER 25 milliGRAM(s) Oral daily  rivaroxaban 15 milliGRAM(s) Oral with dinner  sodium chloride 0.9%. 1000 milliLiter(s) (65 mL/Hr) IV Continuous <Continuous>    MEDICATIONS  (PRN):  glycopyrrolate Injectable 0.2 milliGRAM(s) IV Push every 6 hours PRN secretions  HYDROmorphone  Injectable 0.5 milliGRAM(s) IV Push every 4 hours PRN dyspnea  LORazepam   Injectable 1 milliGRAM(s) IV Push every 4 hours PRN Agitation    LABS:                        13.6   14.32 )-----------( 153      ( 14 Apr 2020 05:13 )             41.9     04-14    147<H>  |  112<H>  |  32<H>  ----------------------------<  156<H>  4.1   |  31  |  1.23    Ca    8.8      14 Apr 2020 05:13    TPro  7.4  /  Alb  2.9<L>  /  TBili  1.0  /  DBili  x   /  AST  41<H>  /  ALT  56  /  AlkPhos  130<H>  04-14        CAPILLARY BLOOD GLUCOSE                RADIOLOGY & ADDITIONAL TESTS:    Consultant(s) Notes Reviewed:  [x ] YES  [ ] NO    PHYSICAL EXAM:  GENERAL: NAD, NRB   HEAD:  Atraumatic, Normocephalic  EYES: EOMI, PERRLA, conjunctiva and sclera clear  ENMT: No tonsillar erythema, exudates, or enlargement; Moist mucous membranes, Good dentition, No lesions  NECK: Supple, No JVD, Normal thyroid  NERVOUS SYSTEM:  Alert &  No focal deficit   CHEST/LUNG: Good air entry bilateral with no  rales, rhonchi, wheezing, or rubs  HEART: Regular rate and rhythm; No murmurs, rubs, or gallops  ABDOMEN: Soft, Nontender, Nondistended; Bowel sounds present  EXTREMITIES:  2+ Peripheral Pulses, No clubbing, cyanosis, or edema    Care Discussed with Consultants/Other Providers [ x] YES  [ ] NO

## 2020-04-15 NOTE — PROGRESS NOTE ADULT - SUBJECTIVE AND OBJECTIVE BOX
Remains on NRB, confused, unable to obtain ROS  	  MEDICATIONS:  MEDICATIONS  (STANDING):  anakinra Injectable 100 milliGRAM(s) SubCutaneous every 6 hours  HYDROmorphone  Injectable 0.5 milliGRAM(s) IV Push once  hydroxychloroquine 400 milliGRAM(s) Oral every 24 hours  hydroxychloroquine   Oral   latanoprost 0.005% Ophthalmic Solution 1 Drop(s) Both EYES at bedtime  methylPREDNISolone sodium succinate Injectable 40 milliGRAM(s) IV Push two times a day  metoprolol succinate ER 25 milliGRAM(s) Oral daily  rivaroxaban 15 milliGRAM(s) Oral with dinner  sodium chloride 0.9%. 1000 milliLiter(s) (65 mL/Hr) IV Continuous <Continuous>      LABS:	 	    CARDIAC MARKERS:                                13.6   14.32 )-----------( 153      ( 14 Apr 2020 05:13 )             41.9     Hemoglobin: 13.6 g/dL (04-14 @ 05:13)  Hemoglobin: 13.3 g/dL (04-13 @ 09:11)  Hemoglobin: 12.9 g/dL (04-12 @ 06:56)  Hemoglobin: 15.4 g/dL (04-11 @ 05:41)  Hemoglobin: 15.3 g/dL (04-10 @ 13:28)      04-14    147<H>  |  112<H>  |  32<H>  ----------------------------<  156<H>  4.1   |  31  |  1.23    Ca    8.8      14 Apr 2020 05:13  Phos  2.1     04-13  Mg     2.8     04-13    TPro  7.4  /  Alb  2.9<L>  /  TBili  1.0  /  DBili  x   /  AST  41<H>  /  ALT  56  /  AlkPhos  130<H>  04-14    Creatinine Trend: 1.23<--, 1.32<--, 1.01<--, 1.00<--, 1.39<--  per chart      PHYSICAL EXAM:  T(C): 36.8 (04-14-20 @ 14:23), Max: 36.8 (04-14-20 @ 14:23)  HR: 61 (04-14-20 @ 14:23) (46 - 112)  BP: 155/99 (04-14-20 @ 14:23) (132/99 - 185/105)  RR: 22 (04-14-20 @ 14:23) (20 - 28)  SpO2: 100% (04-14-20 @ 14:23) (96% - 100%)  Wt(kg): --  I&O's Summary    HEENT:  (-)icterus (-)pallor  CV: N S1 S2 1/6 JA (+)2 Pulses B/l  Resp:  Clear to ausculatation B/L, normal effort  GI: (+) BS Soft, NT, ND  Lymph:  (-)Edema, (-)obvious lymphadenopathy  Skin: Warm to touch, Normal turgor  Psych: Appropriate mood and affect        ASSESSMENT/PLAN: 	87y  Male H of COPD (EMPHYSEMA), Atrial fib, Borderline HTN, comes to the ED with complaints of shortness of breath B/L PNA on xray COVID(+)    - HR appropriate on beta blocker  - cont xarelto  - Steroids, anikara and Plaquenil per med per primary team  - Patient is DNR/DNI    Olegario Tafoya MD, [facility]  BEEPER [phone] Remains on NRB, confused, unable to obtain ROS  	  MEDICATIONS:  MEDICATIONS  (STANDING):  anakinra Injectable 100 milliGRAM(s) SubCutaneous every 6 hours  HYDROmorphone  Injectable 0.5 milliGRAM(s) IV Push once  hydroxychloroquine 400 milliGRAM(s) Oral every 24 hours  hydroxychloroquine   Oral   latanoprost 0.005% Ophthalmic Solution 1 Drop(s) Both EYES at bedtime  methylPREDNISolone sodium succinate Injectable 40 milliGRAM(s) IV Push two times a day  metoprolol succinate ER 25 milliGRAM(s) Oral daily  rivaroxaban 15 milliGRAM(s) Oral with dinner  sodium chloride 0.9%. 1000 milliLiter(s) (65 mL/Hr) IV Continuous <Continuous>      LABS:	 	    CARDIAC MARKERS:                                13.6   14.32 )-----------( 153      ( 14 Apr 2020 05:13 )             41.9     Hemoglobin: 13.6 g/dL (04-14 @ 05:13)  Hemoglobin: 13.3 g/dL (04-13 @ 09:11)  Hemoglobin: 12.9 g/dL (04-12 @ 06:56)  Hemoglobin: 15.4 g/dL (04-11 @ 05:41)  Hemoglobin: 15.3 g/dL (04-10 @ 13:28)      04-14    147<H>  |  112<H>  |  32<H>  ----------------------------<  156<H>  4.1   |  31  |  1.23    Ca    8.8      14 Apr 2020 05:13  Phos  2.1     04-13  Mg     2.8     04-13    TPro  7.4  /  Alb  2.9<L>  /  TBili  1.0  /  DBili  x   /  AST  41<H>  /  ALT  56  /  AlkPhos  130<H>  04-14    Creatinine Trend: 1.23<--, 1.32<--, 1.01<--, 1.00<--, 1.39<--  per chart      PHYSICAL EXAM:  T(C): 36.8 (04-14-20 @ 14:23), Max: 36.8 (04-14-20 @ 14:23)  HR: 61 (04-14-20 @ 14:23) (46 - 112)  BP: 155/99 (04-14-20 @ 14:23) (132/99 - 185/105)  RR: 22 (04-14-20 @ 14:23) (20 - 28)  SpO2: 100% (04-14-20 @ 14:23) (96% - 100%)  Wt(kg): --  I&O's Summary    HEENT:  (-)icterus (-)pallor  CV: N S1 S2 1/6 JA (+)2 Pulses B/l  Resp:  Clear to ausculatation B/L, normal effort  GI: (+) BS Soft, NT, ND  Lymph:  (-)Edema, (-)obvious lymphadenopathy  Skin: Warm to touch, Normal turgor  Psych: Unable to assess mood and affect        ASSESSMENT/PLAN: 	87y  Male H of COPD (EMPHYSEMA), Atrial fib, Borderline HTN, comes to the ED with complaints of shortness of breath B/L PNA on xray COVID(+)    - HR appropriate on beta blocker  - cont xarelto  - Steroids, anikara and Plaquenil per med per primary team  - Patient is DNR/DNI    Olegario Tafoya MD, [facility]  BEEPER [phone]

## 2020-04-15 NOTE — PROGRESS NOTE ADULT - ASSESSMENT
87M from home, [place] speaking,  ambulating with walker with PMH of COPD (EMPHYSEMA), Atrial fib, Borderline HTN, comes to the ED with complaints of shortness of breath. Pt was apparently normal till a week ago when he first developed fever and dry cough. He visited his Primary care and was presribed  Azithromycin and Hcq. He took the Azithromycin for 3 days. His symptoms had not resolved. His oxygen saturations at the home pulse oximetry was noted to be running low between 75-80% that concerned him to visit  the hospital. His 87y wife is also endorsing similar symptoms with lesser intensity. She is also been admitted for the Covid r/o.   Pt admitted for R/O COVID  DNR/DNI     Problem/Plan - 1:  ·  Problem: Acute Respiratory failure with hypoxia.  Plan: Sec to COVID 19 . Solumedrol added. p/w SOB, cough   - WBC:mild elevation 10k   - CXR -- b/l pneumonia likely 2/2 viral pna   - Pt on NRB,   -Started on plaquenil.   -on contact and airborne isolation precaution   f/u Covid-19 results  - LDH, Procalcitonin, ferritin   - Tylenol, Albuterol Inhaler, Robitussin PRN.      Problem/Plan - 2:  ·  Problem: Atrial fibrillation.  Plan: Pt has PMH of atrial fibrillation  on telemetry monitoring  c/w home medication xarelto and metoprolol.      Problem/Plan - 3:  ·  Problem: SUNG (acute kidney injury).  Plan: On admission creatinine levels elevated  1.3( baseline unknown)  s/p NS bolus in the ED   C/W IV hydration  f/u BMP am.      Problem/Plan - 4:  ·  Problem: Viral pneumonia.  Plan: same as above  pt completed OP course of Azithrox3 days.      Problem/Plan - 5:  ·  Problem: COPD (chronic obstructive pulmonary disease).  Plan: pt has known PMH of COPD  no acute exacerbration  c/w albuterol inhalation prn.      Problem/Plan - 6:  Problem: Goals of care, counseling/discussion. Plan: GOC discussion done with daughter Miguel Mccoy- [phone]  Pt poor prognosis explained.  Daughter accepted for the DNR/DNI.     Problem/Plan - 7:  ·  Problem: Prophylactic measure.  Plan: IMPROVE VTE Individual Risk Assessmen                                               1    IMPROVE VTE Score: 2  Pt on xarelto 20mg od.

## 2020-04-15 NOTE — PROGRESS NOTE ADULT - PROBLEM SELECTOR PLAN 1
Secondary to COVID -19  Continue Plaquenil and Solumedrol, Anakinra  Continue supplemental oxygen  Continue Airborne isolation precautions

## 2020-04-15 NOTE — PROGRESS NOTE ADULT - SUBJECTIVE AND OBJECTIVE BOX
Patient is a 87y old  Male who presents with a chief complaint of shortness of breath (12 Apr 2020 12:02)      SUBJECTIVE / OVERNIGHT EVENTS: Patient in NAD. Patient confused, Has a NRB on    Vital Signs Last 24 Hrs  T(C): 36.3 (15 Apr 2020 05:30), Max: 37.3 (14 Apr 2020 18:18)  T(F): 97.3 (15 Apr 2020 05:30), Max: 99.1 (14 Apr 2020 18:18)  HR: 62 (15 Apr 2020 05:30) (61 - 86)  BP: 133/93 (15 Apr 2020 05:30) (118/66 - 155/99)  BP(mean): --  RR: 20 (15 Apr 2020 05:30) (20 - 28)  SpO2: 91% (15 Apr 2020 05:30) (91% - 100%)      PHYSICAL EXAM    CONSTITUTIONAL: NAD, well-developed, well-groomed  ENMT: Moist oral mucosa  RESPIRATORY: Crackles ,diminished to bases. On a NRB  CARDIOVASCULAR: Regular rate and rhythm No lower extremity edema  ABDOMEN: Nontender to palpation, normoactive bowel sounds          LABS:  cret                        13.6   14.32 )-----------( 153      ( 14 Apr 2020 05:13 )             41.9     04-14    147<H>  |  112<H>  |  32<H>  ----------------------------<  156<H>  4.1   |  31  |  1.23    Ca    8.8      14 Apr 2020 05:13    TPro  7.4  /  Alb  2.9<L>  /  TBili  1.0  /  DBili  x   /  AST  41<H>  /  ALT  56  /  AlkPhos  130<H>  04-14                COVID-19 PCR: Detected (10 Apr 2020 13:28)      MEDICATIONS  (STANDING):  anakinra Injectable 100 milliGRAM(s) SubCutaneous every 6 hours  latanoprost 0.005% Ophthalmic Solution 1 Drop(s) Both EYES at bedtime  methylPREDNISolone sodium succinate Injectable 40 milliGRAM(s) IV Push two times a day  metoprolol succinate ER 25 milliGRAM(s) Oral daily  rivaroxaban 15 milliGRAM(s) Oral with dinner  sodium chloride 0.9%. 1000 milliLiter(s) (65 mL/Hr) IV Continuous <Continuous>    MEDICATIONS  (PRN):  HYDROmorphone  Injectable 0.5 milliGRAM(s) IV Push every 4 hours PRN dyspnea  LORazepam   Injectable 1 milliGRAM(s) IV Push every 4 hours PRN Agitation

## 2020-04-16 NOTE — PROGRESS NOTE ADULT - SUBJECTIVE AND OBJECTIVE BOX
Remains on NRB, agonal breathing, unable to obtain ROS  	  MEDICATIONS:  MEDICATIONS  (STANDING):  anakinra Injectable 100 milliGRAM(s) SubCutaneous every 6 hours  HYDROmorphone  Injectable 0.5 milliGRAM(s) IV Push once  hydroxychloroquine 400 milliGRAM(s) Oral every 24 hours  hydroxychloroquine   Oral   latanoprost 0.005% Ophthalmic Solution 1 Drop(s) Both EYES at bedtime  methylPREDNISolone sodium succinate Injectable 40 milliGRAM(s) IV Push two times a day  metoprolol succinate ER 25 milliGRAM(s) Oral daily  rivaroxaban 15 milliGRAM(s) Oral with dinner  sodium chloride 0.9%. 1000 milliLiter(s) (65 mL/Hr) IV Continuous <Continuous>      LABS:	 	    CARDIAC MARKERS:                                13.6   14.32 )-----------( 153      ( 14 Apr 2020 05:13 )             41.9     Hemoglobin: 13.6 g/dL (04-14 @ 05:13)  Hemoglobin: 13.3 g/dL (04-13 @ 09:11)  Hemoglobin: 12.9 g/dL (04-12 @ 06:56)  Hemoglobin: 15.4 g/dL (04-11 @ 05:41)  Hemoglobin: 15.3 g/dL (04-10 @ 13:28)      04-14    147<H>  |  112<H>  |  32<H>  ----------------------------<  156<H>  4.1   |  31  |  1.23    Ca    8.8      14 Apr 2020 05:13  Phos  2.1     04-13  Mg     2.8     04-13    TPro  7.4  /  Alb  2.9<L>  /  TBili  1.0  /  DBili  x   /  AST  41<H>  /  ALT  56  /  AlkPhos  130<H>  04-14    Creatinine Trend: 1.23<--, 1.32<--, 1.01<--, 1.00<--, 1.39<--  per chart      PHYSICAL EXAM:  T(C): 36.8 (04-14-20 @ 14:23), Max: 36.8 (04-14-20 @ 14:23)  HR: 61 (04-14-20 @ 14:23) (46 - 112)  BP: 155/99 (04-14-20 @ 14:23) (132/99 - 185/105)  RR: 22 (04-14-20 @ 14:23) (20 - 28)  SpO2: 100% (04-14-20 @ 14:23) (96% - 100%)  Wt(kg): --  I&O's Summary    HEENT:  (-)icterus (-)pallor  CV: N S1 S2 1/6 JA (+)2 Pulses B/l  Resp:  Clear to ausculatation B/L, normal effort  GI: (+) BS Soft, NT, ND  Lymph:  (-)Edema, (-)obvious lymphadenopathy  Skin: Warm to touch, Normal turgor  Psych: unable to assess mood or affect        ASSESSMENT/PLAN: 	87y  Male H of COPD (EMPHYSEMA), Atrial fib, Borderline HTN, comes to the ED with complaints of shortness of breath B/L PNA on xray COVID(+)    - remains on NRB  - Agonal Breathing  - Poor prognosis  - Patient is DNR/DNI    Olegario Tafoya MD, FACC  BEEPER [phone]

## 2020-04-16 NOTE — DIETITIAN INITIAL EVALUATION ADULT. - OTHER INFO
Pt W Covid+. Pt seen for LOS. Pt is on airborne isolation. Pt is on 100 NRM . Pt on Regular Diet. PO intake is  Poor. Pt is confused and lethargic.

## 2020-04-16 NOTE — PROGRESS NOTE ADULT - ASSESSMENT
87M from home, [place] speaking,  ambulating with walker with PMH of COPD (EMPHYSEMA), Atrial fib, Borderline HTN, comes to the ED with complaints of shortness of breath. Pt was apparently normal till a week ago when he first developed fever and dry cough. He visited his Primary care and was presribed  Azithromycin and Hcq. He took the Azithromycin for 3 days. His symptoms had not resolved. His oxygen saturations at the home pulse oximetry was noted to be running low between 75-80% that concerned him to visit  the hospital. His 87y wife is also endorsing similar symptoms with lesser intensity. She is also been admitted for the Covid r/o.   Pt admitted for R/O COVID  DNR/DNI     Problem/Plan - 1:  ·  Problem: Acute Respiratory failure with hypoxia.  Plan: Sec to COVID 19 . Solumedrol added. p/w SOB, cough   - WBC:mild elevation 10k   - CXR -- b/l pneumonia likely 2/2 viral pna   - Pt on NRB,   -Started on plaquenil.   -on contact and airborne isolation precaution   f/u Covid-19 results  - LDH, Procalcitonin, ferritin   - Tylenol, Albuterol Inhaler, Robitussin PRN.      Problem/Plan - 2:  ·  Problem: Atrial fibrillation.  Plan: Pt has PMH of atrial fibrillation  on telemetry monitoring  c/w home medication xarelto and metoprolol.      Problem/Plan - 3:  ·  Problem: SUNG (acute kidney injury).  Plan: On admission creatinine levels elevated  1.3( baseline unknown)  s/p NS bolus in the ED   C/W IV hydration  f/u BMP am.      Problem/Plan - 4:  ·  Problem: Viral pneumonia.  Plan: same as above  pt completed OP course of Azithrox3 days.      Problem/Plan - 5:  ·  Problem: COPD (chronic obstructive pulmonary disease).  Plan: pt has known PMH of COPD  no acute exacerbration  c/w albuterol inhalation prn.      Problem/Plan - 6:  Problem: Goals of care, counseling/discussion. Plan: GOC discussion done with daughter Miguel Mccoy- [phone]  Pt poor prognosis explained.  Daughter accepted for the DNR/DNI.     Problem/Plan - 7:  ·  Problem: Prophylactic measure.  Plan: IMPROVE VTE Individual Risk Assessmen                                               1    IMPROVE VTE Score: 2  Pt on xarelto 20mg od.     Prognosis very poor .

## 2020-04-16 NOTE — DIETITIAN INITIAL EVALUATION ADULT. - PROBLEM SELECTOR PLAN 6
GOC discussion done with daughter Miguel Mccoy- [phone]  Pt poor prognosis explained.  Daughter accepted for the DNR/DNI

## 2020-04-16 NOTE — DISCHARGE NOTE FOR THE EXPIRED PATIENT - HOSPITAL COURSE
86yo male from home, [facility] speaking,  ambulating with walker with PMH of COPD (EMPHYSEMA), Atrial fib, Borderline HTN, comes to the ED with complaints of shortness of breath. Pt was apparently normal till a week ago when he first developed fever and dry cough. He visited his Primary care and was presribed  Azithromycin and Hcq. He took the Azithromycin for 3 days. His symptoms had not resolved. His oxygen saturations at the home pulse oximetry was noted to be running low between 75-80%.    Pt admitted for R/O COVID  DNR/DNI

## 2020-04-16 NOTE — PROGRESS NOTE ADULT - SUBJECTIVE AND OBJECTIVE BOX
INTERVAL HPI/OVERNIGHT EVENTS: SOB   Vital Signs Last 24 Hrs  T(C): 37.8 (16 Apr 2020 14:28), Max: 37.8 (16 Apr 2020 14:28)  T(F): 100.1 (16 Apr 2020 14:28), Max: 100.1 (16 Apr 2020 14:28)  HR: 77 (16 Apr 2020 14:28) (51 - 115)  BP: 116/50 (16 Apr 2020 14:28) (116/50 - 143/77)  BP(mean): 66 (16 Apr 2020 14:28) (66 - 66)  RR: 36 (16 Apr 2020 14:28) (20 - 36)  SpO2: 89% (16 Apr 2020 14:28) (89% - 96%)  I&O's Summary    MEDICATIONS  (STANDING):  anakinra Injectable 100 milliGRAM(s) SubCutaneous every 6 hours  latanoprost 0.005% Ophthalmic Solution 1 Drop(s) Both EYES at bedtime  methylPREDNISolone sodium succinate Injectable 40 milliGRAM(s) IV Push two times a day  metoprolol succinate ER 25 milliGRAM(s) Oral daily  rivaroxaban 15 milliGRAM(s) Oral with dinner  sodium chloride 0.9%. 1000 milliLiter(s) (65 mL/Hr) IV Continuous <Continuous>    MEDICATIONS  (PRN):  acetaminophen  Suppository .. 650 milliGRAM(s) Rectal every 6 hours PRN Temp greater or equal to 38C (100.4F)  glycopyrrolate Injectable 0.2 milliGRAM(s) IV Push every 6 hours PRN secretions  HYDROmorphone  Injectable 0.5 milliGRAM(s) IV Push every 4 hours PRN dyspnea  LORazepam   Injectable 1 milliGRAM(s) IV Push every 4 hours PRN Agitation    LABS:              Consultant(s) Notes Reviewed:  [x ] YES  [ ] NO    PHYSICAL EXAM:  GENERAL: NAD, NRB   HEAD:  Atraumatic, Normocephalic  NECK: Supple, No JVD, Normal thyroid  NERVOUS SYSTEM:  Alert   CHEST/LUNG: Good air entry bilateral with no  rales, rhonchi, wheezing, or rubs  HEART: Regular rate and rhythm; No murmurs, rubs, or gallops  ABDOMEN: Soft, Nontender, Nondistended; Bowel sounds present  EXTREMITIES:  2+ Peripheral Pulses, No clubbing, cyanosis, or edema  SKIN: No rashes or lesions    Care Discussed with Consultants/Other Providers [ x] YES  [ ] NO

## 2020-04-16 NOTE — CHART NOTE - NSCHARTNOTEFT_GEN_A_CORE
NP called back son and dtr to inform that once they've made  arrangements, the  home will  their father (pt) from our hospital.  Son and dtr verbalized understanding.

## 2020-04-16 NOTE — DIETITIAN INITIAL EVALUATION ADULT. - PROBLEM SELECTOR PLAN 3
On admission creatinine levels elevated  1.3( baseline unknown)  s/p NS bolus in the ED   C/W IV hydration  f/u BMP am

## 2020-04-16 NOTE — DIETITIAN INITIAL EVALUATION ADULT. - PROBLEM SELECTOR PLAN 1
p/w SOB, cough   - WBC:mild elevation 10k   - CXR -- b/l pneumonia likely 2/2 viral pna   - Pt on NRB, SPO2 97%, no signs of distress noted,  -Started on plaquenil.   -on contact and airborne isolation precaution   f/u Covid-19 results  - LDH, Procalcitonin, ferritin   - Tylenol, Albuterol Inhaler, Robitussin PRN

## 2022-06-08 NOTE — H&P ADULT - NSHPLABSRESULTS_GEN_ALL_CORE
bnp decreased compared to recent , appears dry on exam , s/p IV fluids   - hold lasix   - strict ins and outs
15.3   10.60 )-----------( 235      ( 10 Apr 2020 13:28 )             46.1       04-10    140  |  105  |  19<H>  ----------------------------<  131<H>  4.0   |  29  |  1.39<H>    Ca    9.1      10 Apr 2020 13:28    TPro  8.9<H>  /  Alb  3.3<L>  /  TBili  0.6  /  DBili  x   /  AST  65<H>  /  ALT  104<H>  /  AlkPhos  104  04-10                  PT/INR - ( 10 Apr 2020 13:28 )   PT: 35.0 sec;   INR: 2.99 ratio         PTT - ( 10 Apr 2020 13:28 )  PTT:42.3 sec    Lactate Trend            CAPILLARY BLOOD GLUCOSE            < from: Xray Chest 1 View-PORTABLE IMMEDIATE (04.10.20 @ 13:50) >    MPRESSION: Bilateral lung parenchymal airspace opacities suggesting bilateral pneumonia.
